# Patient Record
Sex: FEMALE | Race: WHITE | NOT HISPANIC OR LATINO | URBAN - METROPOLITAN AREA
[De-identification: names, ages, dates, MRNs, and addresses within clinical notes are randomized per-mention and may not be internally consistent; named-entity substitution may affect disease eponyms.]

---

## 2018-02-08 ENCOUNTER — APPOINTMENT (OUTPATIENT)
Dept: URBAN - METROPOLITAN AREA CLINIC 200 | Age: 63
Setting detail: DERMATOLOGY
End: 2018-02-13

## 2018-02-08 DIAGNOSIS — B36.0 PITYRIASIS VERSICOLOR: ICD-10-CM

## 2018-02-08 PROCEDURE — OTHER PRESCRIPTION: OTHER

## 2018-02-08 PROCEDURE — 99202 OFFICE O/P NEW SF 15 MIN: CPT

## 2018-02-08 RX ORDER — CICLOPIROX 7.7 MG/ML
SUSPENSION TOPICAL
Qty: 1 | Refills: 6 | Status: ERX

## 2018-02-08 ASSESSMENT — LOCATION SIMPLE DESCRIPTION DERM
LOCATION SIMPLE: RIGHT BREAST
LOCATION SIMPLE: LEFT BREAST

## 2018-02-08 ASSESSMENT — SEVERITY ASSESSMENT: SEVERITY: MILD

## 2018-02-08 ASSESSMENT — LOCATION ZONE DERM: LOCATION ZONE: TRUNK

## 2018-02-08 ASSESSMENT — LOCATION DETAILED DESCRIPTION DERM
LOCATION DETAILED: RIGHT LATERAL BREAST 6-7:00 REGION
LOCATION DETAILED: LEFT INFRAMAMMARY CREASE (OUTER QUADRANT)

## 2018-02-08 NOTE — HPI: SECONDARY COMPLAINT
How Severe Is This Condition?: severe
Additional History: Worse in winter
How Severe Is This Condition?: moderate

## 2018-03-08 ENCOUNTER — HOSPITAL ENCOUNTER (OUTPATIENT)
Dept: PSYCHOLOGY | Facility: CLINIC | Age: 63
Discharge: HOME | End: 2018-03-08
Attending: PSYCHOLOGIST
Payer: MEDICARE

## 2018-03-08 DIAGNOSIS — F43.23 ADJUSTMENT DISORDER WITH MIXED ANXIETY AND DEPRESSED MOOD: Primary | ICD-10-CM

## 2018-03-08 ASSESSMENT — COGNITIVE AND FUNCTIONAL STATUS - GENERAL
ATTENTION: WNL
EST. PREMORBID INTELLIGENCE: ABOVE AVERAGE
AFFECT: FULL RANGE
CONCENTRATION: WNL
EYE_CONTACT: WNL
PSYCHOMOTOR FUNCTIONING: WNL
THOUGHT_PROCESS: WNL
MOOD: EUTHYMIC (NORMAL)
ORIENTATION: FULLY ORIENTED
THOUGHT_CONTENT: APPROPRIATE
SPEECH: REGULAR
INSIGHT: INTACT
REMOTE MEMORY: WNL
LIBIDO: NO CHANGE
AROUSAL LEVEL: ALERT
APPEARANCE: WELL GROOMED
RECENT MEMORY: WNL
SLEEP_WAKE_CYCLE: NO CHANGE
APPETITE: NO CHANGE
IMPULSE CONTROL: INTACT
DELUSIONS: NONE OR AGE APPROPRIATE
PERCEPTUAL FUNCTION: NORMAL

## 2018-03-08 NOTE — PROGRESS NOTES
Outpatient Psychology Progress Note    3/8/2018  Duration: 1 hour   [place] Cooper : 1955, a 62 y.o. female, for follow up visit.  Reason:  She has been experiencing adjustment and family issues.  HPI: MS     Current Evaluation:     Mental Status Evaluation:  Arousal Level: Alert  Appearance: Well Groomed  Speech: Regular  Psychomotor Functioning: WNL  Eye Contact: WNL  Est. Premorbid Intelligence: Above average  Orientation: Fully oriented  Attention: WNL  Concentration: WNL  Recent Memory: WNL  Remote Memory: WNL  Thought Content: Appropriate  Thought Process: WNL  Insight: Intact  Perceptual Function: Normal  Delusions: None or age appropriate  Sleeping: No Change  Appetite: No Change  Libido: No change  Affect: Full Range  Mood: Euthymic (normal)    Comments:      Additional Assessments done this visit:       Session Summary:   Pt generally doing well, engaged in rewarding personal and community activities.  Occasionally struggles c boundary issues involving daughter. Feels has lost focus in her program and has instead gotten sidetracked by administrative details. Agrees to get back to using her daily reader.     Interventions  Acceptance & Adjustment  Monitoring of Symptoms    Psychoeducation provided on:  Caregiver Issues     Recommendations:   Individual Therapy   1 hour q 2-3 weeks    Goals     None          Visit Diagnosis:   No diagnosis found.    Diagnostic Impression:        Psychological condition is generally improving       Shelly Le, PhD @ 4:16 PM

## 2018-03-29 ENCOUNTER — HOSPITAL ENCOUNTER (OUTPATIENT)
Dept: PSYCHOLOGY | Facility: CLINIC | Age: 63
Discharge: HOME | End: 2018-03-29
Attending: PSYCHOLOGIST
Payer: MEDICARE

## 2018-03-29 DIAGNOSIS — F43.23 ADJUSTMENT DISORDER WITH MIXED ANXIETY AND DEPRESSED MOOD: ICD-10-CM

## 2018-03-29 ASSESSMENT — COGNITIVE AND FUNCTIONAL STATUS - GENERAL
ORIENTATION: FULLY ORIENTED
CONCENTRATION: WNL
IMPULSE CONTROL: INTACT
APPEARANCE: WELL GROOMED
APPETITE: NO CHANGE
EST. PREMORBID INTELLIGENCE: ABOVE AVERAGE
EYE_CONTACT: WNL
THOUGHT_PROCESS: WNL
PSYCHOMOTOR FUNCTIONING: WNL
SPEECH: REGULAR;PRESSURED
AROUSAL LEVEL: ALERT
INSIGHT: INTACT
SLEEP_WAKE_CYCLE: NO CHANGE
THOUGHT_CONTENT: APPROPRIATE
ATTENTION: WNL
LIBIDO: NO CHANGE
RECENT MEMORY: WNL
REMOTE MEMORY: WNL
DELUSIONS: NONE OR AGE APPROPRIATE
PERCEPTUAL FUNCTION: NORMAL
MOOD: DEPRESSED;ANXIOUS
AFFECT: TENSE

## 2018-03-29 NOTE — PROGRESS NOTES
"Outpatient Psychology Progress Note    3/29/2018  Duration: 45 minutes   [place] Cooper : 1955, a 62 y.o. female, for follow up visit.  Reason:  She has been experiencing emotional distress, family stress.  HPI: MS     Current Evaluation:     Mental Status Evaluation:  Arousal Level: Alert  Appearance: Well Groomed  Speech: Regular, Pressured  Psychomotor Functioning: WNL  Eye Contact: WNL  Est. Premorbid Intelligence: Above average  Orientation: Fully oriented  Attention: WNL  Concentration: WNL  Recent Memory: WNL  Remote Memory: WNL  Thought Content: Appropriate  Thought Process: WNL  Insight: Intact  Perceptual Function: Normal  Delusions: None or age appropriate  Sleeping: No Change  Appetite: No Change  Libido: No change  Affect: Tense  Mood: Depressed, Anxious    Comments:      Additional Assessments done this visit:       Session Summary:   Pt adjusting to being \"empty zoe\".  While pleased c daughter's new living arrangements, she misses living c her grandchildren and is feeling a sense of loss. Affect tense due to this and to stress of possibly preparing to sell home.  Pt approaching in an organized, well planned manner. Coping appropriately c stress.  Starting new diet plan c weight loss of about 6 lbs to date.     Interventions  Acceptance & Adjustment  Anxiety Reduction Techniques  Monitoring of Symptoms    Psychoeducation provided on:  Other: n/a     Recommendations:   Individual Therapy   45 minutes 2 times monthly    Goals     None          Visit Diagnosis:   F43.23    Diagnostic Impression:        Psychological condition is generally improving       Shelly Le, PhD @ 4:04 PM  "

## 2018-04-23 ENCOUNTER — HOSPITAL ENCOUNTER (OUTPATIENT)
Dept: PSYCHOLOGY | Facility: CLINIC | Age: 63
Discharge: HOME | End: 2018-04-23
Payer: MEDICARE

## 2018-04-23 DIAGNOSIS — F43.23 ADJUSTMENT DISORDER WITH MIXED ANXIETY AND DEPRESSED MOOD: ICD-10-CM

## 2018-04-23 ASSESSMENT — COGNITIVE AND FUNCTIONAL STATUS - GENERAL
SLEEP_WAKE_CYCLE: DECREASED
MOOD: ANXIOUS
APPETITE: NO CHANGE
AFFECT: FULL RANGE
PERCEPTUAL FUNCTION: NORMAL
DELUSIONS: NONE OR AGE APPROPRIATE
CONCENTRATION: WNL
EST. PREMORBID INTELLIGENCE: ABOVE AVERAGE
EYE_CONTACT: WNL
THOUGHT_CONTENT: APPROPRIATE
APPEARANCE: WELL GROOMED
ATTENTION: WNL
LIBIDO: NO CHANGE
THOUGHT_PROCESS: WNL
IMPULSE CONTROL: INTACT
RECENT MEMORY: WNL
REMOTE MEMORY: WNL
AROUSAL LEVEL: ALERT
ORIENTATION: FULLY ORIENTED
PSYCHOMOTOR FUNCTIONING: WNL
INSIGHT: INTACT
SPEECH: REGULAR

## 2018-04-23 NOTE — PROGRESS NOTES
Outpatient Psychology Progress Note    2018  Duration: 45 minutes   [place] Cooper : 1955, a 62 y.o. female, for follow up visit.  Reason:  She has been experiencing adjustment and family issues    HPI: MS  Chief Complaint   Patient presents with   • Psychotherapy       Current Evaluation:     Mental Status Evaluation:  Arousal Level: Alert  Appearance: Well Groomed  Speech: Regular  Psychomotor Functioning: WNL  Eye Contact: WNL  Est. Premorbid Intelligence: Above average  Orientation: Fully oriented  Attention: WNL  Concentration: WNL  Recent Memory: WNL  Remote Memory: WNL  Thought Content: Appropriate  Thought Process: WNL  Insight: Intact  Perceptual Function: Normal  Delusions: None or age appropriate  Sleeping: Decreased  Appetite: No Change  Libido: No change  Affect: Full Range  Mood: Anxious    Comments:      Additional Assessments done this visit:       Session Summary:   Pt stressed by plan to put home on market one year ahead of initial plan. Pt finding she is more emotionally attached to home than expected.  She is doing well c taking care of herself and has lost 12 lbs. Pt maintaining consistent boundaries c daughter, despite daughter's current issues and desire to return home.     Interventions  Acceptance & Adjustment  Goal Setting  Monitoring of Symptoms    Psychoeducation provided on:  Other: stress mgmt     Recommendations:   Other: stress mgmt   45 minutes 2 times monthly    Goals     • Maximize adjustment and acceptance of limitations           Visit Diagnosis:   F43.23    Diagnostic Impression:        Psychological condition is generally improving       Shelly Le, PhD @ 11:18 AM

## 2018-05-14 ENCOUNTER — HOSPITAL ENCOUNTER (OUTPATIENT)
Dept: PSYCHOLOGY | Facility: CLINIC | Age: 63
Discharge: HOME | End: 2018-05-14
Payer: MEDICARE

## 2018-05-14 DIAGNOSIS — F43.23 ADJUSTMENT DISORDER WITH MIXED ANXIETY AND DEPRESSED MOOD: Primary | ICD-10-CM

## 2018-05-14 PROCEDURE — 90834 PSYTX W PT 45 MINUTES: CPT | Performed by: PSYCHOLOGIST

## 2018-05-14 ASSESSMENT — COGNITIVE AND FUNCTIONAL STATUS - GENERAL
EST. PREMORBID INTELLIGENCE: ABOVE AVERAGE
ATTENTION: WNL
PSYCHOMOTOR FUNCTIONING: WNL
CONCENTRATION: WNL
PERCEPTUAL FUNCTION: NORMAL
APPEARANCE: WELL GROOMED
THOUGHT_PROCESS: WNL
THOUGHT_CONTENT: APPROPRIATE
EYE_CONTACT: WNL
APPETITE: NO CHANGE
REMOTE MEMORY: WNL
DELUSIONS: NONE OR AGE APPROPRIATE
AFFECT: LABILE
IMPULSE CONTROL: INTACT
LIBIDO: NO CHANGE
INSIGHT: INTACT
SLEEP_WAKE_CYCLE: NO CHANGE
SPEECH: REGULAR
MOOD: ANXIOUS;DEPRESSED
RECENT MEMORY: WNL
AROUSAL LEVEL: ALERT
ORIENTATION: FULLY ORIENTED

## 2018-05-14 NOTE — PROGRESS NOTES
Outpatient Psychology Progress Note    2018  Duration: 45 minutes   [place] Cooper : 1955, a 62 y.o. female, for follow up visit.  Reason:  She has been experiencing adjustment issues.    HPI:   Chief Complaint   Patient presents with   • Psychotherapy       Current Evaluation:     Mental Status Evaluation:  Arousal Level: Alert  Appearance: Well Groomed  Speech: Regular  Psychomotor Functioning: WNL  Eye Contact: WNL  Est. Premorbid Intelligence: Above average  Orientation: Fully oriented  Attention: WNL  Concentration: WNL  Recent Memory: WNL  Remote Memory: WNL  Thought Content: Appropriate  Thought Process: WNL  Insight: Intact  Perceptual Function: Normal  Delusions: None or age appropriate  Sleeping: No Change  Appetite: No Change  Libido: No change  Affect: Labile  Mood: Anxious, Depressed    Comments:      Additional Assessments done this visit:       Session Summary:   Pt c labile affect, struggling c decision to sell home and more quickly than planned.  Has strong emotional attachment to home and has no concrete plan about where to go next. Processed Issues, normalized feelings and gave feedback about pt's control in this situation.    Interventions  Acceptance & Adjustment  Monitoring of Symptoms    Psychoeducation provided on:  Other: n/a     Recommendations:   Individual Therapy   45 minutes 2 times monthly    Goals     • Maximize adjustment and acceptance of limitations           Visit Diagnosis:     1. Adjustment disorder with mixed anxiety and depressed mood        Diagnostic Impression:        Psychological condition is generally improving       Shelly Le, PhD @ 11:31 AM

## 2018-06-21 ENCOUNTER — HOSPITAL ENCOUNTER (OUTPATIENT)
Dept: PSYCHOLOGY | Facility: CLINIC | Age: 63
Discharge: HOME | End: 2018-06-21
Payer: MEDICARE

## 2018-06-21 DIAGNOSIS — F43.23 ADJUSTMENT DISORDER WITH MIXED ANXIETY AND DEPRESSED MOOD: Primary | ICD-10-CM

## 2018-06-21 PROCEDURE — 90834 PSYTX W PT 45 MINUTES: CPT | Performed by: PSYCHOLOGIST

## 2018-06-21 RX ORDER — ROSUVASTATIN CALCIUM 20 MG/1
TABLET, COATED ORAL
Refills: 1 | COMMUNITY
Start: 2018-04-02

## 2018-06-21 RX ORDER — METHYLPHENIDATE HYDROCHLORIDE 10 MG/1
TABLET ORAL
Refills: 0 | COMMUNITY
Start: 2018-06-11

## 2018-06-21 RX ORDER — TRIAMTERENE AND HYDROCHLOROTHIAZIDE 37.5; 25 MG/1; MG/1
CAPSULE ORAL
Refills: 2 | COMMUNITY
Start: 2018-03-24

## 2018-06-21 RX ORDER — BUPROPION HYDROCHLORIDE 300 MG/1
TABLET ORAL
Refills: 0 | COMMUNITY
Start: 2018-05-14

## 2018-06-21 RX ORDER — GABAPENTIN 100 MG/1
CAPSULE ORAL
Refills: 3 | COMMUNITY
Start: 2018-05-04

## 2018-06-21 ASSESSMENT — COGNITIVE AND FUNCTIONAL STATUS - GENERAL
EST. PREMORBID INTELLIGENCE: ABOVE AVERAGE
SPEECH: REGULAR
AFFECT: FULL RANGE
PERCEPTUAL FUNCTION: NORMAL
CONCENTRATION: WNL
ORIENTATION: FULLY ORIENTED
REMOTE MEMORY: WNL
INSIGHT: INTACT
AROUSAL LEVEL: ALERT
APPETITE: NO CHANGE
ATTENTION: WNL
MOOD: EUTHYMIC (NORMAL)
EYE_CONTACT: WNL
THOUGHT_PROCESS: WNL
PSYCHOMOTOR FUNCTIONING: WNL
RECENT MEMORY: WNL
THOUGHT_CONTENT: APPROPRIATE
DELUSIONS: NONE OR AGE APPROPRIATE

## 2018-06-21 NOTE — PROGRESS NOTES
Outpatient Psychology Progress Note    Duration: 45 minutes   [place] Cooper : 1955, a 62 y.o. female, for follow up visit.  Reason:  She has been experiencing adjustment issues.    HPI: MS  Chief Complaint   Patient presents with   • Psychotherapy       Current Evaluation:     Mental Status Evaluation:  Arousal Level: Alert  Speech: Regular  Psychomotor Functioning: WNL  Eye Contact: WNL  Est. Premorbid Intelligence: Above average  Orientation: Fully oriented  Attention: WNL  Concentration: WNL  Recent Memory: WNL  Remote Memory: WNL  Thought Content: Appropriate  Thought Process: WNL  Insight: Intact  Perceptual Function: Normal  Delusions: None or age appropriate  Appetite: No Change  Affect: Full Range  Mood: Euthymic (normal)    Comments:      Additional Assessments done this visit:       Session Summary:   Pt c signif improvement in mood, generally + and hopeful but c realistic anxiety re family issues and other stressors.  Pt doing better c limit setting in all domains and feeling less stressed.  Has deliberately and successfully lost weight despite little exercise.  Reinforced current status.    Interventions  Acceptance & Adjustment  Monitoring of Symptoms    Psychoeducation provided on:  Other: n/a     Recommendations:   Individual Therapy   45 minutes 1-2 times monthly    Goals     • Maximize adjustment and acceptance of limitations           Visit Diagnosis:     1. Adjustment disorder with mixed anxiety and depressed mood        Diagnostic Impression:        Psychological condition is generally improving       Shelly Le, PhD @ 4:08 PM

## 2018-07-12 ENCOUNTER — HOSPITAL ENCOUNTER (OUTPATIENT)
Dept: PSYCHOLOGY | Facility: CLINIC | Age: 63
Discharge: HOME | End: 2018-07-12
Payer: MEDICARE

## 2018-07-12 DIAGNOSIS — F43.23 ADJUSTMENT DISORDER WITH MIXED ANXIETY AND DEPRESSED MOOD: Primary | ICD-10-CM

## 2018-07-12 PROCEDURE — 90834 PSYTX W PT 45 MINUTES: CPT | Performed by: PSYCHOLOGIST

## 2018-07-12 ASSESSMENT — COGNITIVE AND FUNCTIONAL STATUS - GENERAL
EYE_CONTACT: WNL
AFFECT: FULL RANGE
LIBIDO: NO CHANGE
PERCEPTUAL FUNCTION: NORMAL
SPEECH: REGULAR
AROUSAL LEVEL: ALERT
MOOD: ANXIOUS
CONCENTRATION: WNL
APPEARANCE: WELL GROOMED
DELUSIONS: NONE OR AGE APPROPRIATE
ATTENTION: WNL
EST. PREMORBID INTELLIGENCE: ABOVE AVERAGE
RECENT MEMORY: WNL
ORIENTATION: FULLY ORIENTED
APPETITE: NO CHANGE
REMOTE MEMORY: WNL
SLEEP_WAKE_CYCLE: NO CHANGE
PSYCHOMOTOR FUNCTIONING: WNL

## 2018-07-13 NOTE — PROGRESS NOTES
Outpatient Psychology Progress Note    Duration: 45 minutes   [place] Cooper : 1955, a 62 y.o. female, for follow up visit.  Reason:  She has been experiencing adjustment issues.    HPI:   Chief Complaint   Patient presents with   • Psychotherapy   • Multiple Sclerosis       Current Evaluation:     Mental Status Evaluation:  Arousal Level: Alert  Appearance: Well Groomed  Speech: Regular  Psychomotor Functioning: WNL  Eye Contact: WNL  Est. Premorbid Intelligence: Above average  Orientation: Fully oriented  Attention: WNL  Concentration: WNL  Recent Memory: WNL  Remote Memory: WNL  Perceptual Function: Normal  Delusions: None or age appropriate  Sleeping: No Change  Appetite: No Change  Libido: No change  Affect: Full Range  Mood: Anxious    Comments:      Additional Assessments done this visit:       Session Summary:   Pt c many recent + experiences, leading to improved adjustment and marital satisfaction. However, pt expresses anxiety about upcoming home sale and move.  Tends to worry about what others think of her; acknowledges and willing to address.      Interventions  Acceptance & Adjustment  Monitoring of Symptoms  CBT    Psychoeducation provided on:  Other: n/a     Recommendations:   Individual Therapy  2x/month    Goals     • Maximize adjustment and acceptance of limitations           Visit Diagnosis:     1. Adjustment disorder with mixed anxiety and depressed mood        Diagnostic Impression:        Psychological condition is generally improving       Shelly Le, PhD @ 7:24 AM

## 2018-09-20 ENCOUNTER — HOSPITAL ENCOUNTER (OUTPATIENT)
Dept: PSYCHOLOGY | Facility: CLINIC | Age: 63
Discharge: HOME | End: 2018-09-20
Payer: MEDICARE

## 2018-09-20 DIAGNOSIS — F43.23 ADJUSTMENT DISORDER WITH MIXED ANXIETY AND DEPRESSED MOOD: Primary | ICD-10-CM

## 2018-09-20 PROCEDURE — 90834 PSYTX W PT 45 MINUTES: CPT | Performed by: PSYCHOLOGIST

## 2018-09-20 ASSESSMENT — COGNITIVE AND FUNCTIONAL STATUS - GENERAL
INSIGHT: INTACT
CONCENTRATION: IMPAIRED, MILD
THOUGHT_PROCESS: RUMINATIONS;WORRY
ATTENTION: WNL
APPETITE: DECREASED
SPEECH: REGULAR
EST. PREMORBID INTELLIGENCE: ABOVE AVERAGE
PERCEPTUAL FUNCTION: NORMAL
SLEEP_WAKE_CYCLE: NO CHANGE
DELUSIONS: NONE OR AGE APPROPRIATE
IMPULSE CONTROL: INTACT
MOOD: ANXIOUS;OTHER:
AFFECT: TENSE
EYE_CONTACT: WNL
PSYCHOMOTOR FUNCTIONING: WNL
RECENT MEMORY: WNL
APPEARANCE: WELL GROOMED
ORIENTATION: FULLY ORIENTED
AROUSAL LEVEL: ALERT

## 2018-09-20 NOTE — PROGRESS NOTES
Outpatient Psychology Progress Note    Duration: 45 minutes   [place] Cooper : 1955, a 62 y.o. female, for follow up visit.  Reason:  She has been experiencing adjustment issues.    HPI:   Chief Complaint   Patient presents with   • Psychotherapy   • Depression       Current Evaluation:     Mental Status Evaluation:  Arousal Level: Alert  Appearance: Well Groomed  Speech: Regular  Psychomotor Functioning: WNL  Eye Contact: WNL  Est. Premorbid Intelligence: Above average  Orientation: Fully oriented  Attention: WNL  Concentration: Impaired, Mild  Recent Memory: WNL  Thought Process: Ruminations, Worry  Insight: Intact  Perceptual Function: Normal  Delusions: None or age appropriate  Sleeping: No Change  Appetite: Decreased  Affect: Tense  Mood: Anxious, Other:    Comments:     Additional Assessments done this visit:       Session Summary:   Pt c multiple stressors and decreased coping.  Tends to be overly self critical and to feel others are judging her when they are not.  Is pleased that she lost 32 lbs but not pleased c other life areas.  Is starting to reach out and use resources.  Much current dissatisfaction about new community; everyone is older and many are disabled.     Interventions  Acceptance & Adjustment  Cognitive Behavior Training  Monitoring of Symptoms    Psychoeducation provided on:  Other: stress mgmt     Recommendations:   Individual Therapy   45 minutes 2 times monthly    Goals     • Maximize adjustment and acceptance of limitations           Visit Diagnosis:     1. Adjustment disorder with mixed anxiety and depressed mood        Diagnostic Impression:        Psychological condition is generally worsening       Shelly Le, PhD @ 4:13 PM

## 2018-10-04 ENCOUNTER — HOSPITAL ENCOUNTER (OUTPATIENT)
Dept: PSYCHOLOGY | Facility: CLINIC | Age: 63
Discharge: HOME | End: 2018-10-04
Payer: MEDICARE

## 2018-10-04 DIAGNOSIS — F43.23 ADJUSTMENT DISORDER WITH MIXED ANXIETY AND DEPRESSED MOOD: Primary | ICD-10-CM

## 2018-10-04 PROCEDURE — 90834 PSYTX W PT 45 MINUTES: CPT | Performed by: PSYCHOLOGIST

## 2018-10-04 ASSESSMENT — COGNITIVE AND FUNCTIONAL STATUS - GENERAL
THOUGHT_CONTENT: APPROPRIATE
PSYCHOMOTOR FUNCTIONING: WNL
INSIGHT: INTACT
EST. PREMORBID INTELLIGENCE: ABOVE AVERAGE
APPEARANCE: WELL GROOMED
SLEEP_WAKE_CYCLE: NO CHANGE
RECENT MEMORY: WNL
PERCEPTUAL FUNCTION: NORMAL
AFFECT: TENSE
IMPULSE CONTROL: INTACT
MOOD: ANXIOUS;OTHER:
APPETITE: NO CHANGE
LIBIDO: NO CHANGE
ATTENTION: WNL
DELUSIONS: NONE OR AGE APPROPRIATE
CONCENTRATION: WNL
EYE_CONTACT: WNL
AROUSAL LEVEL: ALERT
ORIENTATION: FULLY ORIENTED
SPEECH: REGULAR
THOUGHT_PROCESS: RUMINATIONS;WORRY

## 2018-10-04 NOTE — PROGRESS NOTES
"Outpatient Psychology Progress Note    Duration: 45 minutes   [place] Cooper : 1955, a 62 y.o. female, for follow up visit.  Reason:  She has been experiencing adjustment issues.    HPI:   Chief Complaint   Patient presents with   • Psychotherapy       Current Evaluation:     Mental Status Evaluation:  Arousal Level: Alert  Appearance: Well Groomed  Speech: Regular  Psychomotor Functioning: WNL  Eye Contact: WNL  Est. Premorbid Intelligence: Above average  Orientation: Fully oriented  Attention: WNL  Concentration: WNL  Recent Memory: WNL  Thought Content: Appropriate  Thought Process: Ruminations, Worry  Insight: Intact  Perceptual Function: Normal  Delusions: None or age appropriate  Sleeping: No Change  Appetite: No Change  Libido: No change  Affect: Tense  Mood: Anxious, Other:, mildly depressed    Comments:      Additional Assessments done this visit:       Session Summary:   Pt feeling that she \"doesn't fit in\" to current life in multiple ways. Mood is mildly depressed and anxious.  Explored feelings and reasons for current emotions.  Pt having difficulty c prioritizing and time mgmt in face of multiple demands and wishes.     Interventions  Acceptance & Adjustment  Monitoring of Symptoms    Psychoeducation provided on:  Other: n/a     Recommendations:   Individual Therapy   45 minutes 2 times monthly    Goals     • Maximize adjustment and acceptance of limitations           Visit Diagnosis:     1. Adjustment disorder with mixed anxiety and depressed mood        Diagnostic Impression:        Psychological condition is generally improving       Shelly Le, PhD @ 4:05 PM  "

## 2018-11-01 ENCOUNTER — HOSPITAL ENCOUNTER (OUTPATIENT)
Dept: PSYCHOLOGY | Facility: CLINIC | Age: 63
Discharge: HOME | End: 2018-11-01
Payer: MEDICARE

## 2018-11-01 DIAGNOSIS — F43.23 ADJUSTMENT DISORDER WITH MIXED ANXIETY AND DEPRESSED MOOD: Primary | ICD-10-CM

## 2018-11-01 PROCEDURE — 90834 PSYTX W PT 45 MINUTES: CPT | Performed by: PSYCHOLOGIST

## 2018-11-01 ASSESSMENT — COGNITIVE AND FUNCTIONAL STATUS - GENERAL
PSYCHOMOTOR FUNCTIONING: WNL
THOUGHT_PROCESS: WNL
DELUSIONS: NONE OR AGE APPROPRIATE
SPEECH: REGULAR
CONCENTRATION: WNL
ORIENTATION: FULLY ORIENTED
REMOTE MEMORY: WNL
ATTENTION: WNL
PERCEPTUAL FUNCTION: NORMAL
RECENT MEMORY: WNL
INSIGHT: INTACT
AFFECT: FULL RANGE
LIBIDO: NO CHANGE
APPETITE: NO CHANGE
AROUSAL LEVEL: ALERT
MOOD: EUTHYMIC (NORMAL)
SLEEP_WAKE_CYCLE: NO CHANGE
APPEARANCE: WELL GROOMED
EYE_CONTACT: WNL
EST. PREMORBID INTELLIGENCE: ABOVE AVERAGE

## 2018-11-01 NOTE — PROGRESS NOTES
Outpatient Psychology Progress Note    Duration: 45 minutes   [place] Cooper : 1955, a 63 y.o. female, for follow up visit.  Reason:  She has been experiencing adjustment issues.    HPI:   Chief Complaint   Patient presents with   • Psychotherapy   • Depression       Current Evaluation:     Mental Status Evaluation:  Arousal Level: Alert  Appearance: Well Groomed  Speech: Regular  Psychomotor Functioning: WNL  Eye Contact: WNL  Est. Premorbid Intelligence: Above average  Orientation: Fully oriented  Attention: WNL  Concentration: WNL  Recent Memory: WNL  Remote Memory: WNL  Thought Process: WNL  Insight: Intact  Perceptual Function: Normal  Delusions: None or age appropriate  Sleeping: No Change  Appetite: No Change  Libido: No change  Affect: Full Range  Mood: Euthymic (normal)    Comments:      Additional Assessments done this visit:       Session Summary:   Pt reports recent episode of depression, but today presents c bright affect and mood.  Is more satisfied c current life and less inclined to negatively compare herself to others.  Has lost 45 lbs and is pleased.     Interventions  Acceptance & Adjustment  Monitoring of Symptoms    Psychoeducation provided on:  Other: n/a     Recommendations:   Individual Therapy   45 minutes 2 times monthly    Goals     • Maximize adjustment and acceptance of limitations           Visit Diagnosis:     1. Adjustment disorder with mixed anxiety and depressed mood        Diagnostic Impression:        Psychological condition is generally improving       Shelly Le, PhD @ 4:06 PM

## 2018-12-20 ENCOUNTER — HOSPITAL ENCOUNTER (OUTPATIENT)
Dept: PSYCHOLOGY | Facility: CLINIC | Age: 63
Discharge: HOME | End: 2018-12-20
Payer: MEDICARE

## 2018-12-20 DIAGNOSIS — F43.23 ADJUSTMENT DISORDER WITH MIXED ANXIETY AND DEPRESSED MOOD: Primary | ICD-10-CM

## 2018-12-20 PROCEDURE — 90837 PSYTX W PT 60 MINUTES: CPT | Performed by: PSYCHOLOGIST

## 2018-12-20 ASSESSMENT — COGNITIVE AND FUNCTIONAL STATUS - GENERAL
PSYCHOMOTOR FUNCTIONING: WNL
RECENT MEMORY: WNL
SPEECH: REGULAR
APPEARANCE: WELL GROOMED
MOOD: EUTHYMIC (NORMAL)
AFFECT: FULL RANGE
IMPULSE CONTROL: INTACT
SLEEP_WAKE_CYCLE: NO CHANGE
THOUGHT_PROCESS: WNL
ORIENTATION: FULLY ORIENTED
AROUSAL LEVEL: ALERT
EST. PREMORBID INTELLIGENCE: ABOVE AVERAGE
ATTENTION: WNL
THOUGHT_CONTENT: APPROPRIATE
APPETITE: NO CHANGE
INSIGHT: INTACT
REMOTE MEMORY: WNL
PERCEPTUAL FUNCTION: NORMAL
CONCENTRATION: WNL
LIBIDO: NO CHANGE
DELUSIONS: NONE OR AGE APPROPRIATE

## 2018-12-20 NOTE — PROGRESS NOTES
Outpatient Psychology Progress Note    Duration: 1 hour  [place] Cooper : 1955, a 63 y.o. female, for follow up visit.  Reason:  She has been experiencing adjustment issues.    HPI: MS    Current Evaluation:     Mental Status Evaluation:  Arousal Level: Alert  Appearance: Well Groomed  Speech: Regular  Psychomotor Functioning: WNL  Est. Premorbid Intelligence: Above average  Orientation: Fully oriented  Attention: WNL  Concentration: WNL  Recent Memory: WNL  Remote Memory: WNL  Thought Content: Appropriate  Thought Process: WNL  Insight: Intact  Perceptual Function: Normal  Delusions: None or age appropriate  Sleeping: No Change  Appetite: No Change  Libido: No change  Affect: Full Range  Mood: Euthymic (normal)    Comments:      Additional Assessments done this visit:       Session Summary:   Pt presents c bright affect and mood, mildly anxious that things are too good to last and that something bad will therefore happen.  However, for the most part pt is able to enjoy being in a good place. Pt maintaining weight loss well. Will f/u in 1 month due to improvement..     Interventions  Acceptance & Adjustment  Monitoring of Symptoms    Psychoeducation provided on:  Other: n/a     Recommendations:   Individual Therapy   1 hour 1-2 times monthly    Goals     • Maximize adjustment and acceptance of limitations           Visit Diagnosis:     1. Adjustment disorder with mixed anxiety and depressed mood        Diagnostic Impression:        Psychological condition is generally improving       Shelly Le, PhD @ 4:13 PM

## 2019-01-24 ENCOUNTER — HOSPITAL ENCOUNTER (OUTPATIENT)
Dept: PSYCHOLOGY | Facility: CLINIC | Age: 64
Discharge: HOME | End: 2019-01-24
Payer: MEDICARE

## 2019-01-24 DIAGNOSIS — F43.23 ADJUSTMENT DISORDER WITH MIXED ANXIETY AND DEPRESSED MOOD: Primary | ICD-10-CM

## 2019-01-24 PROCEDURE — 90834 PSYTX W PT 45 MINUTES: CPT | Performed by: PSYCHOLOGIST

## 2019-01-24 ASSESSMENT — COGNITIVE AND FUNCTIONAL STATUS - GENERAL
CONCENTRATION: WNL
RECENT MEMORY: WNL
ATTENTION: WNL
AFFECT: FULL RANGE
APPEARANCE: WELL GROOMED
THOUGHT_PROCESS: WNL
EYE_CONTACT: WNL
PSYCHOMOTOR FUNCTIONING: WNL;FATIGUED
MOOD: EUTHYMIC (NORMAL)
REMOTE MEMORY: WNL
AROUSAL LEVEL: ALERT
PERCEPTUAL FUNCTION: NORMAL
EST. PREMORBID INTELLIGENCE: ABOVE AVERAGE
THOUGHT_CONTENT: APPROPRIATE
LIBIDO: NO CHANGE
SPEECH: REGULAR
DELUSIONS: NONE OR AGE APPROPRIATE
APPETITE: NO CHANGE
INSIGHT: INTACT
IMPULSE CONTROL: INTACT
SLEEP_WAKE_CYCLE: NO CHANGE

## 2019-01-24 NOTE — PROGRESS NOTES
Outpatient Psychology Progress Note    Duration: 45 minutes  [place] Cooper : 1955, a 63 y.o. female, for follow up visit.  Reason:  She has been experiencing adjustment issues.    HPI:   Chief Complaint   Patient presents with   • Psychotherapy   • Multiple Sclerosis       Current Evaluation:     Mental Status Evaluation:  Arousal Level: Alert  Appearance: Well Groomed  Speech: Regular  Psychomotor Functioning: WNL, Fatigued  Eye Contact: WNL  Est. Premorbid Intelligence: Above average  Attention: WNL  Concentration: WNL  Recent Memory: WNL  Remote Memory: WNL  Thought Content: Appropriate  Thought Process: WNL  Insight: Intact  Perceptual Function: Normal  Delusions: None or age appropriate  Sleeping: No Change  Appetite: No Change  Libido: No change  Affect: Full Range  Mood: Euthymic (normal)    Comments:      Additional Assessments done this visit:       Session Summary:   Pt c more fatigue but otherwise doing well.  Exercising and socializing more.  Shows + adjustment.  Encouraged pt to discuss fatigue c her neurologist, given h/o MS. Reinforced current coping.      Interventions  Acceptance & Adjustment  Monitoring of Symptoms    Psychoeducation provided on:  Other: n/a     Recommendations:   Individual Therapy   45 minutes monthly    Goals     • Maximize adjustment and acceptance of limitations           Visit Diagnosis:     1. Adjustment disorder with mixed anxiety and depressed mood        Diagnostic Impression:        Psychological condition is generally improving       Shelly Le, PhD @ 4:04 PM

## 2019-02-25 ENCOUNTER — APPOINTMENT (OUTPATIENT)
Dept: URBAN - METROPOLITAN AREA CLINIC 200 | Age: 64
Setting detail: DERMATOLOGY
End: 2019-02-27

## 2019-02-25 DIAGNOSIS — L57.8 OTHER SKIN CHANGES DUE TO CHRONIC EXPOSURE TO NONIONIZING RADIATION: ICD-10-CM

## 2019-02-25 DIAGNOSIS — D22 MELANOCYTIC NEVI: ICD-10-CM

## 2019-02-25 PROBLEM — D22.39 MELANOCYTIC NEVI OF OTHER PARTS OF FACE: Status: ACTIVE | Noted: 2019-02-25

## 2019-02-25 PROCEDURE — OTHER REASSURANCE: OTHER

## 2019-02-25 PROCEDURE — OTHER MIPS QUALITY: OTHER

## 2019-02-25 PROCEDURE — 99213 OFFICE O/P EST LOW 20 MIN: CPT

## 2019-02-25 PROCEDURE — OTHER COUNSELING: OTHER

## 2019-02-25 ASSESSMENT — LOCATION ZONE DERM
LOCATION ZONE: FACE
LOCATION ZONE: TRUNK

## 2019-02-25 ASSESSMENT — LOCATION SIMPLE DESCRIPTION DERM
LOCATION SIMPLE: LEFT FOREHEAD
LOCATION SIMPLE: CHEST

## 2019-02-25 ASSESSMENT — LOCATION DETAILED DESCRIPTION DERM
LOCATION DETAILED: LEFT SUPERIOR LATERAL FOREHEAD
LOCATION DETAILED: LEFT MEDIAL SUPERIOR CHEST

## 2019-03-07 ENCOUNTER — HOSPITAL ENCOUNTER (OUTPATIENT)
Dept: PSYCHOLOGY | Facility: CLINIC | Age: 64
Discharge: HOME | End: 2019-03-07
Payer: MEDICARE

## 2019-03-07 DIAGNOSIS — F43.23 ADJUSTMENT DISORDER WITH MIXED ANXIETY AND DEPRESSED MOOD: Primary | ICD-10-CM

## 2019-03-07 PROCEDURE — 90834 PSYTX W PT 45 MINUTES: CPT | Performed by: PSYCHOLOGIST

## 2019-03-07 ASSESSMENT — COGNITIVE AND FUNCTIONAL STATUS - GENERAL
AROUSAL LEVEL: ALERT
PERCEPTUAL FUNCTION: NORMAL
SPEECH: REGULAR
LIBIDO: NO CHANGE
EST. PREMORBID INTELLIGENCE: ABOVE AVERAGE
THOUGHT_PROCESS: WNL
DELUSIONS: NONE OR AGE APPROPRIATE
RECENT MEMORY: WNL
CONCENTRATION: WNL
IMPULSE CONTROL: IMPAIRED, MINIMALLY
EYE_CONTACT: WNL
ORIENTATION: FULLY ORIENTED
APPEARANCE: WELL GROOMED
REMOTE MEMORY: WNL
MOOD: DEPRESSED;ANXIOUS
PSYCHOMOTOR FUNCTIONING: WNL;FATIGUED
SLEEP_WAKE_CYCLE: DECREASED
AFFECT: LABILE;TENSE
THOUGHT_CONTENT: APPROPRIATE
APPETITE: INCREASED
INSIGHT: INTACT

## 2019-03-07 NOTE — PROGRESS NOTES
Outpatient Psychology Progress Note    Duration: 45 minutes  [place] Cooper : 1955, a 63 y.o. female, for follow up visit.  Reason:  She has been experiencing adjustment issues.    HPI:   Chief Complaint   Patient presents with   • Psychotherapy   • Depression       Current Evaluation:     Mental Status Evaluation:  Arousal Level: Alert  Appearance: Well Groomed  Speech: Regular  Psychomotor Functioning: WNL, Fatigued  Eye Contact: WNL  Est. Premorbid Intelligence: Above average  Orientation: Fully oriented  Concentration: WNL  Recent Memory: WNL  Remote Memory: WNL  Thought Content: Appropriate  Thought Process: WNL  Insight: Intact  Perceptual Function: Normal  Delusions: None or age appropriate  Sleeping: Decreased  Appetite: Increased  Libido: No change  Affect: Labile, Tense  Mood: Depressed, Anxious    Comments:      Additional Assessments done this visit:       Session Summary:   Pt more depressed and anxious.  Has had recent family health crises and significant change in routine.  Having difficulty getting back to normal schedule, overeating in response to stress and has regained the weight she has struggled to lose.  Feels like a failure.     Identified changes to improve sense of control and mood. Pt agreed to implement     Interventions  Acceptance & Adjustment  Anxiety Reduction Techniques  Monitoring of Symptoms    Psychoeducation provided on:  Other: n/a     Recommendations:   Individual Therapy   45 minutes 2 times monthly    Goals     • Maximize adjustment and acceptance of limitations           Visit Diagnosis:     1. Adjustment disorder with mixed anxiety and depressed mood        Diagnostic Impression:        Psychological condition is generally worsening       Shelly Le, PhD @ 3:56 PM

## 2019-03-29 ENCOUNTER — HOSPITAL ENCOUNTER (OUTPATIENT)
Dept: PSYCHOLOGY | Facility: CLINIC | Age: 64
Discharge: HOME | End: 2019-03-29
Payer: MEDICARE

## 2019-03-29 DIAGNOSIS — F43.23 ADJUSTMENT DISORDER WITH MIXED ANXIETY AND DEPRESSED MOOD: Primary | ICD-10-CM

## 2019-03-29 PROCEDURE — 90834 PSYTX W PT 45 MINUTES: CPT | Performed by: PSYCHOLOGIST

## 2019-03-29 RX ORDER — ROSUVASTATIN CALCIUM 10 MG/1
TABLET, COATED ORAL
Refills: 0 | COMMUNITY
Start: 2019-02-24

## 2019-03-29 RX ORDER — PEGINTERFERON BETA-1A 125 UG/.5ML
INJECTION, SOLUTION SUBCUTANEOUS
COMMUNITY
Start: 2019-03-14

## 2019-03-29 ASSESSMENT — COGNITIVE AND FUNCTIONAL STATUS - GENERAL
IMPULSE CONTROL: INTACT
SLEEP_WAKE_CYCLE: NO CHANGE
DELUSIONS: NONE OR AGE APPROPRIATE
MOOD: ANXIOUS;HOPEFUL
EST. PREMORBID INTELLIGENCE: ABOVE AVERAGE
APPEARANCE: WELL GROOMED
PSYCHOMOTOR FUNCTIONING: WNL
LIBIDO: NO CHANGE
ATTENTION: WNL
REMOTE MEMORY: WNL
APPETITE: NO CHANGE
THOUGHT_CONTENT: APPROPRIATE
PERCEPTUAL FUNCTION: NORMAL
INSIGHT: INTACT
RECENT MEMORY: WNL
SPEECH: REGULAR
ORIENTATION: FULLY ORIENTED
AFFECT: FULL RANGE
THOUGHT_PROCESS: WNL
CONCENTRATION: WNL
AROUSAL LEVEL: ALERT
EYE_CONTACT: WNL

## 2019-03-29 NOTE — PROGRESS NOTES
Outpatient Psychology Progress Note    Duration: 45 minutes  [place] Cooper : 1955, a 63 y.o. female, for follow up visit.  Reason:  She has been experiencing adjustment issues.    HPI:   Chief Complaint   Patient presents with   • Psychotherapy   • Depression   • Multiple Sclerosis       Current Evaluation:     Mental Status Evaluation:  Arousal Level: Alert  Appearance: Well Groomed  Speech: Regular  Psychomotor Functioning: WNL  Eye Contact: WNL  Est. Premorbid Intelligence: Above average  Orientation: Fully oriented  Attention: WNL  Concentration: WNL  Recent Memory: WNL  Remote Memory: WNL  Thought Content: Appropriate  Thought Process: WNL  Insight: Intact  Perceptual Function: Normal  Delusions: None or age appropriate  Sleeping: No Change  Appetite: No Change (managing better)  Libido: No change  Affect: Full Range  Mood: Anxious, Hopeful    Comments:      Additional Assessments done this visit:       Session Summary:   Pt c improved emotional functioning.  Is able to focus on own needs (basic self care, daily time out of home, attending meetings) while helping family through father's health issues.  Assisted pt in setting priorities and reinforced appropriate boundaries.      Interventions  Acceptance & Adjustment  Communication Skills development  Monitoring of Symptoms    Psychoeducation provided on:  Other: n/a     Recommendations:   Individual Therapy   45 minutes 2 times monthly    Goals     • Maximize adjustment and acceptance of limitations           Visit Diagnosis:     1. Adjustment disorder with mixed anxiety and depressed mood        Diagnostic Impression:        Psychological condition is generally improving       Shelly Le, PhD @ 3:47 PM

## 2019-04-25 ENCOUNTER — HOSPITAL ENCOUNTER (OUTPATIENT)
Dept: PSYCHOLOGY | Facility: CLINIC | Age: 64
Discharge: HOME | End: 2019-04-25
Payer: MEDICARE

## 2019-04-25 DIAGNOSIS — F43.23 ADJUSTMENT DISORDER WITH MIXED ANXIETY AND DEPRESSED MOOD: Primary | ICD-10-CM

## 2019-04-25 PROCEDURE — 90834 PSYTX W PT 45 MINUTES: CPT | Performed by: PSYCHOLOGIST

## 2019-04-25 RX ORDER — FEXOFENADINE HCL 60 MG/1
TABLET, FILM COATED ORAL
COMMUNITY

## 2019-04-25 RX ORDER — GABAPENTIN 100 MG/1
100 CAPSULE ORAL 4 TIMES DAILY
COMMUNITY
Start: 2018-05-04

## 2019-04-25 RX ORDER — METHYLPHENIDATE HYDROCHLORIDE 10 MG/1
10 TABLET ORAL 2 TIMES DAILY
COMMUNITY
Start: 2019-04-08

## 2019-04-25 RX ORDER — TRIAMTERENE/HYDROCHLOROTHIAZID 37.5-25 MG
1 TABLET ORAL DAILY
COMMUNITY

## 2019-04-25 RX ORDER — BUPROPION HYDROCHLORIDE 300 MG/1
300 TABLET ORAL
COMMUNITY
Start: 2018-11-27

## 2019-04-25 RX ORDER — ROSUVASTATIN CALCIUM 10 MG/1
TABLET, COATED ORAL
COMMUNITY
Start: 2018-11-27

## 2019-04-25 RX ORDER — NAPROXEN 500 MG/1
TABLET ORAL
COMMUNITY
Start: 2017-06-09

## 2019-04-25 RX ORDER — OXYCODONE AND ACETAMINOPHEN 5; 325 MG/1; MG/1
TABLET ORAL
Refills: 0 | COMMUNITY
Start: 2019-04-09

## 2019-04-25 ASSESSMENT — COGNITIVE AND FUNCTIONAL STATUS - GENERAL
CONCENTRATION: WNL
ATTENTION: WNL
ORIENTATION: FULLY ORIENTED
INSIGHT: INTACT
RECENT MEMORY: WNL
REMOTE MEMORY: WNL
AFFECT: FULL RANGE
EYE_CONTACT: WNL
MOOD: EUTHYMIC (NORMAL);HOPEFUL;MOTIVATED
APPEARANCE: WELL GROOMED
APPETITE: NO CHANGE
PSYCHOMOTOR FUNCTIONING: WNL
THOUGHT_CONTENT: APPROPRIATE
SLEEP_WAKE_CYCLE: NO CHANGE
PERCEPTUAL FUNCTION: NORMAL
SPEECH: REGULAR
IMPULSE CONTROL: INTACT
DELUSIONS: NONE OR AGE APPROPRIATE
AROUSAL LEVEL: ALERT
THOUGHT_PROCESS: WNL
EST. PREMORBID INTELLIGENCE: ABOVE AVERAGE
LIBIDO: NON-CONTRIBUTORY

## 2019-04-25 NOTE — PROGRESS NOTES
Outpatient Psychology Progress Note    Duration: 45 minutes  [place] Cooper : 1955, a 63 y.o. female, for follow up visit.  Reason:  She has been experiencing adjustment issues.    HPI:   Chief Complaint   Patient presents with   • Psychotherapy   • Anxiety   • Depression       Current Evaluation:     Mental Status Evaluation:  Arousal Level: Alert  Appearance: Well Groomed  Speech: Regular  Psychomotor Functioning: WNL  Eye Contact: WNL  Est. Premorbid Intelligence: Above average  Orientation: Fully oriented  Attention: WNL  Concentration: WNL  Recent Memory: WNL  Remote Memory: WNL  Thought Content: Appropriate  Thought Process: WNL  Insight: Intact  Perceptual Function: Normal  Delusions: None or age appropriate  Sleeping: No Change (earlier sleep and wake times)  Appetite: No Change  Libido: Non-Contributory  Affect: Full Range  Mood: Euthymic (normal), Hopeful, Motivated    Comments:      Additional Assessments done this visit:       Session Summary:   Pt managing issues related to weight more effectively using an skyla and a diet.  Pleased c weight loss.  Pt working more actively to address relationship issues c tools from [employer]. More socially and physically active.     Supported gains.      Interventions  Acceptance & Adjustment  Goal Setting  Monitoring of Symptoms    Psychoeducation provided on:  Other: n/a     Recommendations:   Individual Therapy   45 minutes 1-2 times monthly    Goals     • Maximize adjustment and acceptance of limitations           Visit Diagnosis:     1. Adjustment disorder with mixed anxiety and depressed mood        Diagnostic Impression:        Psychological condition is generally improving       Shelly Le, PhD @ 4:15 PM

## 2019-05-08 ENCOUNTER — HOSPITAL ENCOUNTER (OUTPATIENT)
Dept: PSYCHOLOGY | Facility: CLINIC | Age: 64
Discharge: HOME | End: 2019-05-08
Payer: MEDICARE

## 2019-05-08 DIAGNOSIS — F43.23 ADJUSTMENT DISORDER WITH MIXED ANXIETY AND DEPRESSED MOOD: Primary | ICD-10-CM

## 2019-05-08 PROCEDURE — 90834 PSYTX W PT 45 MINUTES: CPT | Performed by: PSYCHOLOGIST

## 2019-05-08 ASSESSMENT — COGNITIVE AND FUNCTIONAL STATUS - GENERAL
REMOTE MEMORY: WNL
AFFECT: FULL RANGE
MOOD: EUTHYMIC (NORMAL);HOPEFUL;MOTIVATED;ANXIOUS
AROUSAL LEVEL: ALERT
PSYCHOMOTOR FUNCTIONING: WNL
DELUSIONS: NONE OR AGE APPROPRIATE
EST. PREMORBID INTELLIGENCE: ABOVE AVERAGE
SLEEP_WAKE_CYCLE: NO CHANGE
ORIENTATION: FULLY ORIENTED
EYE_CONTACT: WNL
APPEARANCE: WELL GROOMED
INSIGHT: INTACT
SPEECH: REGULAR
LIBIDO: NO CHANGE
APPETITE: NO CHANGE
THOUGHT_PROCESS: WNL
IMPULSE CONTROL: INTACT
PERCEPTUAL FUNCTION: NORMAL
ATTENTION: WNL
CONCENTRATION: WNL
THOUGHT_CONTENT: APPROPRIATE
RECENT MEMORY: WNL

## 2019-05-08 NOTE — PROGRESS NOTES
Outpatient Psychology Progress Note    Duration: 45 minutes  Africa Cooper : 1955, a 63 y.o. female, for follow up visit.  Reason:  She has been experiencing adjustment issues.    HPI:   Chief Complaint   Patient presents with   • Psychotherapy   • Depression   • Anxiety       Current Evaluation:     Mental Status Evaluation:  Arousal Level: Alert  Appearance: Well Groomed  Speech: Regular  Psychomotor Functioning: WNL  Eye Contact: WNL  Est. Premorbid Intelligence: Above average  Orientation: Fully oriented  Attention: WNL  Concentration: WNL  Recent Memory: WNL  Remote Memory: WNL  Thought Content: Appropriate  Thought Process: WNL  Insight: Intact  Perceptual Function: Normal  Delusions: None or age appropriate  Sleeping: No Change  Appetite: No Change  Libido: No change  Affect: Full Range  Mood: Euthymic (normal), Hopeful, Motivated, Anxious    Comments:      Additional Assessments done this visit:       Session Summary:   Pt generally doing well.  Maintaining diet and activities.  Making good choices re time but busy, comfortable doing some things for herself rather than devoting time to causes for others. Some anxiety re risk of daughter relapsing but slowly learning to feel more trusting.      Interventions  Acceptance & Adjustment  Monitoring of Symptoms    Psychoeducation provided on:  Other: n/a     Recommendations:   Individual Therapy   45 minutes 1-2 times monthly    Goals     • Maximize adjustment and acceptance of limitations           Visit Diagnosis:     1. Adjustment disorder with mixed anxiety and depressed mood        Diagnostic Impression:        Psychological condition is generally improving       Shelly Le, PhD @ 4:06 PM

## 2019-07-11 ENCOUNTER — HOSPITAL ENCOUNTER (OUTPATIENT)
Dept: PSYCHOLOGY | Facility: CLINIC | Age: 64
Discharge: HOME | End: 2019-07-11
Payer: MEDICARE

## 2019-07-11 DIAGNOSIS — F43.23 ADJUSTMENT DISORDER WITH MIXED ANXIETY AND DEPRESSED MOOD: Primary | ICD-10-CM

## 2019-07-11 PROCEDURE — 90834 PSYTX W PT 45 MINUTES: CPT | Performed by: PSYCHOLOGIST

## 2019-07-11 RX ORDER — VALACYCLOVIR HYDROCHLORIDE 1 G/1
TABLET, FILM COATED ORAL
Refills: 1 | COMMUNITY
Start: 2019-05-30

## 2019-07-11 RX ORDER — BUPROPION HYDROCHLORIDE 300 MG/1
300 TABLET ORAL
COMMUNITY
Start: 2019-05-20

## 2019-07-11 RX ORDER — ROSUVASTATIN CALCIUM 10 MG/1
TABLET, COATED ORAL
COMMUNITY
Start: 2019-05-20

## 2019-07-11 RX ORDER — METHYLPHENIDATE HYDROCHLORIDE 10 MG/1
10 TABLET ORAL 2 TIMES DAILY
COMMUNITY
Start: 2019-05-28

## 2019-07-11 ASSESSMENT — COGNITIVE AND FUNCTIONAL STATUS - GENERAL
EST. PREMORBID INTELLIGENCE: ABOVE AVERAGE
PSYCHOMOTOR FUNCTIONING: WNL
INSIGHT: INTACT
IMPULSE CONTROL: INTACT
REMOTE MEMORY: WNL
AROUSAL LEVEL: ALERT
APPEARANCE: WELL GROOMED
SLEEP_WAKE_CYCLE: NO CHANGE
ATTENTION: WNL
AFFECT: FULL RANGE
THOUGHT_PROCESS: WNL
LIBIDO: NO CHANGE
SPEECH: REGULAR
ORIENTATION: FULLY ORIENTED
APPETITE: INCREASED
CONCENTRATION: WNL
EYE_CONTACT: WNL
DELUSIONS: NONE OR AGE APPROPRIATE
RECENT MEMORY: WNL
MOOD: EUTHYMIC (NORMAL);MOTIVATED

## 2019-07-11 NOTE — PROGRESS NOTES
Outpatient Psychology Progress Note    Duration: 50 minutes  Africa Cooper : 1955, a 63 y.o. female, for follow up visit.  Reason:  She has been experiencing adjustment issues.    HPI:   Chief Complaint   Patient presents with   • Psychotherapy   • Anxiety   • Depression       Current Evaluation:     Mental Status Evaluation:  Arousal Level: Alert  Appearance: Well Groomed  Speech: Regular  Psychomotor Functioning: WNL  Eye Contact: WNL  Est. Premorbid Intelligence: Above average  Orientation: Fully oriented  Attention: WNL  Concentration: WNL  Recent Memory: WNL  Remote Memory: WNL  Thought Process: WNL  Insight: Intact  Delusions: None or age appropriate  Sleeping: No Change  Appetite: Increased  Libido: No change  Affect: Full Range  Mood: Euthymic (normal), Motivated    Comments:      Additional Assessments done this visit:       Session Summary:   Pt c toe fx, which limits activities.  Pt believes recent brain scan shows MS to be stable but is waiting for f/u c her neurologist. Mood is + and pt seeing improvements in interpersonal relationships.     Mild weight gain following coming off diet and snacking excessively but pt is now eating c greater mindfulness and control.     Interventions  Acceptance & Adjustment  Goal Setting  Monitoring of Symptoms    Psychoeducation provided on:  Other: n/a     Recommendations:   Individual Therapy   45 minutes 1-2 times monthly    Goals     • Maximize adjustment and acceptance of limitations           Visit Diagnosis:   No diagnosis found.    Diagnostic Impression:        Psychological condition is generally improving       Shelly Le, PhD @ 4:28 PM

## 2019-08-08 ENCOUNTER — HOSPITAL ENCOUNTER (OUTPATIENT)
Dept: PSYCHOLOGY | Facility: CLINIC | Age: 64
Discharge: HOME | End: 2019-08-08
Payer: MEDICARE

## 2019-08-08 DIAGNOSIS — F43.23 ADJUSTMENT DISORDER WITH MIXED ANXIETY AND DEPRESSED MOOD: Primary | ICD-10-CM

## 2019-08-08 PROCEDURE — 90834 PSYTX W PT 45 MINUTES: CPT | Performed by: PSYCHOLOGIST

## 2019-08-08 ASSESSMENT — COGNITIVE AND FUNCTIONAL STATUS - GENERAL
MOOD: MOTIVATED;FRUSTRATED;ANXIOUS
IMPULSE CONTROL: IMPAIRED, MINIMALLY
EST. PREMORBID INTELLIGENCE: ABOVE AVERAGE
APPEARANCE: WELL GROOMED
SPEECH: REGULAR
SLEEP_WAKE_CYCLE: NO CHANGE
DELUSIONS: NONE OR AGE APPROPRIATE
AROUSAL LEVEL: ALERT
ATTENTION: WNL
ORIENTATION: FULLY ORIENTED
AFFECT: FULL RANGE
RECENT MEMORY: WNL
REMOTE MEMORY: WNL
APPETITE: INCREASED
PERCEPTUAL FUNCTION: NORMAL
THOUGHT_CONTENT: APPROPRIATE
EYE_CONTACT: WNL
LIBIDO: NO CHANGE
INSIGHT: INTACT
THOUGHT_PROCESS: NEGATIVITY;WNL
PSYCHOMOTOR FUNCTIONING: WNL
CONCENTRATION: WNL

## 2019-08-08 NOTE — PROGRESS NOTES
Outpatient Psychology Progress Note    Duration: 45 minutes  Africa Cooper : 1955, a 63 y.o. female, for follow up visit.  Reason:  She has been experiencing adjustment issuees.    HPI:   Chief Complaint   Patient presents with   • Psychotherapy   • Anxiety   • Depression   • Multiple Sclerosis       Current Evaluation:     Mental Status Evaluation:  Arousal Level: Alert  Appearance: Well Groomed  Speech: Regular  Psychomotor Functioning: WNL  Eye Contact: WNL  Est. Premorbid Intelligence: Above average  Orientation: Fully oriented  Attention: WNL  Concentration: WNL  Recent Memory: WNL  Remote Memory: WNL  Thought Content: Appropriate  Thought Process: Negativity, WNL  Insight: Intact  Perceptual Function: Normal  Delusions: None or age appropriate  Sleeping: No Change  Appetite: Increased  Libido: No change  Affect: Full Range  Mood: Motivated, Frustrated, Anxious    Comments:      Additional Assessments done this visit:       Session Summary:   Pt has had significant stress in the past few weeks. Has dealt c it relatively well but has been overeating, leading to a 10 lb weight gain. Pt frustrated c herself but beginning to get back on track and to view weight loss as possible again. Worked to establish reasonable weight goal which does not involve comparing herself to others.     Pt anxious about reapplication for Disability although has the same cog challenges which led to approval in the first place.       Interventions  Acceptance & Adjustment  Goal Setting  Monitoring of Symptoms    Psychoeducation provided on:  Other: n/a     Recommendations:   Individual Therapy   45 minutes 1-2 times monthly    Goals     • Maximize adjustment and acceptance of limitations           Visit Diagnosis:   No diagnosis found.    Diagnostic Impression:        Psychological condition is generally worsening       Shelly Le, PhD @ 4:11 PM

## 2019-08-29 ENCOUNTER — HOSPITAL ENCOUNTER (OUTPATIENT)
Dept: PSYCHOLOGY | Facility: CLINIC | Age: 64
Discharge: HOME | End: 2019-08-29
Payer: MEDICARE

## 2019-08-29 DIAGNOSIS — F43.23 ADJUSTMENT DISORDER WITH MIXED ANXIETY AND DEPRESSED MOOD: Primary | ICD-10-CM

## 2019-08-29 PROCEDURE — 90834 PSYTX W PT 45 MINUTES: CPT | Performed by: PSYCHOLOGIST

## 2019-08-29 ASSESSMENT — COGNITIVE AND FUNCTIONAL STATUS - GENERAL
RECENT MEMORY: WNL
AROUSAL LEVEL: ALERT
EYE_CONTACT: WNL
ORIENTATION: FULLY ORIENTED
THOUGHT_PROCESS: WNL;NEGATIVITY
SPEECH: REGULAR
THOUGHT_CONTENT: APPROPRIATE
MOOD: MOTIVATED;FRUSTRATED;ANXIOUS
CONCENTRATION: WNL
INSIGHT: INTACT
SLEEP_WAKE_CYCLE: NO CHANGE
LIBIDO: NO CHANGE
PSYCHOMOTOR FUNCTIONING: WNL
APPETITE: INCREASED
EST. PREMORBID INTELLIGENCE: ABOVE AVERAGE
AFFECT: FULL RANGE
APPEARANCE: WELL GROOMED
ATTENTION: WNL
REMOTE MEMORY: WNL
DELUSIONS: NONE OR AGE APPROPRIATE
PERCEPTUAL FUNCTION: NORMAL

## 2019-08-29 NOTE — PROGRESS NOTES
Outpatient Psychology Progress Note    Duration: 45 minutes  Africa Cooper : 1955, a 63 y.o. female, for follow up visit.  Reason:  She has been experiencing adjustment issues.    HPI:   Chief Complaint   Patient presents with   • Psychotherapy   • Anxiety   • Depression   • Multiple Sclerosis       Current Evaluation:     Mental Status Evaluation:  Arousal Level: Alert  Appearance: Well Groomed  Speech: Regular  Psychomotor Functioning: WNL  Eye Contact: WNL  Est. Premorbid Intelligence: Above average  Orientation: Fully oriented  Attention: WNL  Concentration: WNL  Recent Memory: WNL  Remote Memory: WNL  Thought Content: Appropriate  Thought Process: WNL, Negativity  Insight: Intact  Perceptual Function: Normal  Delusions: None or age appropriate  Sleeping: No Change  Appetite: Increased  Libido: No change  Affect: Full Range  Mood: Motivated, Frustrated, Anxious    Comments:      Additional Assessments done this visit:       Session Summary:   Pt frustrated by 10 lb weight gain and by relapse into old habit of night time eating.  Identified 3 concrete behavioral changes, including emailing me daily c weight and update.     Pt feeling more concerned about daughter and resentful of perceived need to intervene/be available.      Interventions  Acceptance & Adjustment  Communication Skills development  Monitoring of Symptoms    Psychoeducation provided on:  Other: weight control strategies     Recommendations:   Individual Therapy   45 minutes 2 times monthly    Goals     • Maximize adjustment and acceptance of limitations           Visit Diagnosis:     1. Adjustment disorder with mixed anxiety and depressed mood        Diagnostic Impression:        Psychological condition is generally showing no change       Shelly Le, PhD @ 3:58 PM

## 2019-09-12 ENCOUNTER — HOSPITAL ENCOUNTER (OUTPATIENT)
Dept: PSYCHOLOGY | Facility: CLINIC | Age: 64
Discharge: HOME | End: 2019-09-12
Payer: MEDICARE

## 2019-09-12 DIAGNOSIS — F43.23 ADJUSTMENT DISORDER WITH MIXED ANXIETY AND DEPRESSED MOOD: Primary | ICD-10-CM

## 2019-09-12 PROCEDURE — 90834 PSYTX W PT 45 MINUTES: CPT | Performed by: PSYCHOLOGIST

## 2019-09-12 ASSESSMENT — COGNITIVE AND FUNCTIONAL STATUS - GENERAL
SPEECH: REGULAR
SLEEP_WAKE_CYCLE: INCREASED
EST. PREMORBID INTELLIGENCE: ABOVE AVERAGE
PSYCHOMOTOR FUNCTIONING: WNL;FATIGUED
DELUSIONS: NONE OR AGE APPROPRIATE
APPETITE: NO CHANGE
LIBIDO: NO CHANGE
AROUSAL LEVEL: ALERT
IMPULSE CONTROL: IMPAIRED, MINIMALLY
CONCENTRATION: WNL
MOOD: ANXIOUS;FRUSTRATED;MOTIVATED
ORIENTATION: FULLY ORIENTED
PERCEPTUAL FUNCTION: NORMAL
THOUGHT_CONTENT: APPROPRIATE
APPEARANCE: WELL GROOMED
AFFECT: FULL RANGE
EYE_CONTACT: WNL
THOUGHT_PROCESS: WNL;WORRY
RECENT MEMORY: WNL
INSIGHT: INTACT
ATTENTION: WNL
REMOTE MEMORY: WNL

## 2019-09-12 NOTE — PROGRESS NOTES
Outpatient Psychology Progress Note    Duration: 45 minutes  Africa Cooper : 1955, a 63 y.o. female, for follow up visit.  Reason:  She has been experiencing adjustment issues.    HPI:   Chief Complaint   Patient presents with   • Psychotherapy   • Anxiety   • Depression   • Multiple Sclerosis       Current Evaluation:     Mental Status Evaluation:  Arousal Level: Alert  Appearance: Well Groomed  Speech: Regular  Psychomotor Functioning: WNL, Fatigued  Eye Contact: WNL  Est. Premorbid Intelligence: Above average  Orientation: Fully oriented  Attention: WNL  Concentration: WNL  Recent Memory: WNL  Remote Memory: WNL  Thought Content: Appropriate  Thought Process: WNL, Worry  Insight: Intact  Perceptual Function: Normal  Delusions: None or age appropriate  Sleeping: Increased  Appetite: No Change  Libido: No change  Affect: Full Range  Mood: Anxious, Frustrated, Motivated    Comments:      Additional Assessments done this visit:       Session Summary:   Pt showing improved eating and sleeping habits since we implemented a daily check in procedure.  However, she has not lost the weight she expected and is frustrated by this.     Pt experiencing increased fatigue due to MS and inconsistent in limiting activities.     Pt expresses appropriate concern re family issues.  Receptive to feedback and suggestions.      Interventions  Acceptance & Adjustment  Cognitive Behavior Training  Communication Skills development  Monitoring of Symptoms    Psychoeducation provided on:  Other: n/a     Recommendations:   Individual Therapy   45 minutes 2 times monthly    Goals     • Maximize adjustment and acceptance of limitations           Visit Diagnosis:     1. Adjustment disorder with mixed anxiety and depressed mood        Diagnostic Impression:        Psychological condition is generally improving       Shelly Le, PhD @ 4:08 PM

## 2019-09-26 ENCOUNTER — HOSPITAL ENCOUNTER (OUTPATIENT)
Dept: PSYCHOLOGY | Facility: CLINIC | Age: 64
Discharge: HOME | End: 2019-09-26
Payer: MEDICARE

## 2019-09-26 DIAGNOSIS — F43.23 ADJUSTMENT DISORDER WITH MIXED ANXIETY AND DEPRESSED MOOD: Primary | ICD-10-CM

## 2019-09-26 PROCEDURE — 90834 PSYTX W PT 45 MINUTES: CPT | Performed by: PSYCHOLOGIST

## 2019-09-26 ASSESSMENT — COGNITIVE AND FUNCTIONAL STATUS - GENERAL
RECENT MEMORY: WNL
CONCENTRATION: WNL
PSYCHOMOTOR FUNCTIONING: WNL
REMOTE MEMORY: WNL
ORIENTATION: FULLY ORIENTED
EYE_CONTACT: WNL
AFFECT: FULL RANGE
THOUGHT_PROCESS: WNL;WORRY
INSIGHT: INTACT
SPEECH: REGULAR
AROUSAL LEVEL: ALERT
DELUSIONS: NONE OR AGE APPROPRIATE
SLEEP_WAKE_CYCLE: DECREASED
APPEARANCE: WELL GROOMED
LIBIDO: NO CHANGE
APPETITE: NO CHANGE
ATTENTION: WNL
PERCEPTUAL FUNCTION: NORMAL
MOOD: MOTIVATED;HOPEFUL;ANXIOUS
EST. PREMORBID INTELLIGENCE: ABOVE AVERAGE

## 2019-09-26 NOTE — PROGRESS NOTES
Outpatient Psychology Progress Note    Duration: 50 minutes  Africa Cooper : 1955, a 63 y.o. female, for follow up visit.  Reason:  She has been experiencing adjustment issues.    HPI:   Chief Complaint   Patient presents with   • Psychotherapy   • Anxiety   • Depression       Current Evaluation:     Mental Status Evaluation:  Arousal Level: Alert  Appearance: Well Groomed  Speech: Regular  Psychomotor Functioning: WNL  Eye Contact: WNL  Est. Premorbid Intelligence: Above average  Orientation: Fully oriented  Attention: WNL  Concentration: WNL  Recent Memory: WNL  Remote Memory: WNL  Thought Process: WNL, Worry  Insight: Intact  Perceptual Function: Normal  Delusions: None or age appropriate  Sleeping: Decreased (not going to be early on consistent basis)  Appetite: No Change  Libido: No change  Affect: Full Range  Mood: Motivated, Hopeful, Anxious    Comments:      Additional Assessments done this visit:       Session Summary:   Pt maintaining but not losing weight.  A little frustrated but motivated and insightful re her behavior and how it impacts her eating/weight.  Has been less successful c early bedtime but is not snacking as much as she used to. Socializing more and is enjoying this.     Handling interpersonal situations better and maintaining boundaries.  Appropriate concerns.     Interventions  Acceptance & Adjustment  Monitoring of Symptoms    Psychoeducation provided on:  Other: n/a     Recommendations:   Individual Therapy   45 minutes 2 times monthly    Goals     • Maximize adjustment and acceptance of limitations           Visit Diagnosis:     1. Adjustment disorder with mixed anxiety and depressed mood        Diagnostic Impression:        Psychological condition is generally showing no change       Shelly Le, PhD @ 4:08 PM

## 2019-11-14 ENCOUNTER — HOSPITAL ENCOUNTER (OUTPATIENT)
Dept: PSYCHOLOGY | Facility: CLINIC | Age: 64
Discharge: HOME | End: 2019-11-14
Payer: MEDICARE

## 2019-11-14 DIAGNOSIS — F43.23 ADJUSTMENT DISORDER WITH MIXED ANXIETY AND DEPRESSED MOOD: Primary | ICD-10-CM

## 2019-11-14 PROCEDURE — 90834 PSYTX W PT 45 MINUTES: CPT | Performed by: PSYCHOLOGIST

## 2019-11-14 ASSESSMENT — COGNITIVE AND FUNCTIONAL STATUS - GENERAL
THOUGHT_CONTENT: APPROPRIATE
EST. PREMORBID INTELLIGENCE: ABOVE AVERAGE
AROUSAL LEVEL: ALERT
ATTENTION: WNL
CONCENTRATION: WNL
AFFECT: TENSE
INSIGHT: INTACT
DELUSIONS: NONE OR AGE APPROPRIATE
EYE_CONTACT: WNL
REMOTE MEMORY: WNL
PSYCHOMOTOR FUNCTIONING: WNL
PERCEPTUAL FUNCTION: NORMAL
THOUGHT_PROCESS: WNL;WORRY
APPETITE: NO CHANGE
MOOD: MOTIVATED;HOPEFUL;ANXIOUS
ORIENTATION: FULLY ORIENTED
IMPULSE CONTROL: IMPAIRED, MINIMALLY
SPEECH: REGULAR
APPEARANCE: WELL GROOMED
RECENT MEMORY: WNL
SLEEP_WAKE_CYCLE: NO CHANGE

## 2019-11-14 NOTE — PROGRESS NOTES
Outpatient Psychology Progress Note    Duration: 45 minutes  Africa Cooper : 1955, a 64 y.o. female, for follow up visit.  Reason:  She has been experiencing adjustment issues.    HPI:   Chief Complaint   Patient presents with   • Psychotherapy   • Anxiety   • Depression       Current Evaluation:     Mental Status Evaluation:  Arousal Level: Alert  Appearance: Well Groomed  Speech: Regular  Psychomotor Functioning: WNL  Eye Contact: WNL  Est. Premorbid Intelligence: Above average  Orientation: Fully oriented  Attention: WNL  Concentration: WNL  Recent Memory: WNL  Remote Memory: WNL  Thought Content: Appropriate  Thought Process: WNL, Worry  Insight: Intact  Perceptual Function: Normal  Delusions: None or age appropriate  Sleeping: No Change  Appetite: No Change  Affect: Tense  Mood: Motivated, Hopeful, Anxious    Comments:      Additional Assessments done this visit:       Session Summary:   Pt c recent family stressors but able to see + side as well.  Session focused on boundary issues, enabling vs helping and establishing consequences. Pt maintains her diet in spite of stress and has lost an additional 7 lbs.      Interventions  Acceptance & Adjustment  Cognitive Behavior Training  Communication Skills development  Monitoring of Symptoms    Psychoeducation provided on:  Other: n/a     Recommendations:   Individual Therapy   45 minutes 1-2 times monthly    Goals     • Maximize adjustment and acceptance of limitations           Visit Diagnosis:     1. Adjustment disorder with mixed anxiety and depressed mood        Diagnostic Impression:        Psychological condition is generally improving       Shelly Le, PhD @ 4:05 PM

## 2019-12-20 ENCOUNTER — HOSPITAL ENCOUNTER (OUTPATIENT)
Dept: PSYCHOLOGY | Facility: CLINIC | Age: 64
Discharge: HOME | End: 2019-12-20
Payer: MEDICARE

## 2019-12-20 DIAGNOSIS — F43.23 ADJUSTMENT DISORDER WITH MIXED ANXIETY AND DEPRESSED MOOD: Primary | ICD-10-CM

## 2019-12-20 PROCEDURE — 90834 PSYTX W PT 45 MINUTES: CPT | Performed by: PSYCHOLOGIST

## 2019-12-20 ASSESSMENT — COGNITIVE AND FUNCTIONAL STATUS - GENERAL
PSYCHOMOTOR FUNCTIONING: FATIGUED
THOUGHT_CONTENT: APPROPRIATE
LIBIDO: NO CHANGE
THOUGHT_PROCESS: WNL
ATTENTION: WNL
IMPULSE CONTROL: IMPAIRED, MINIMALLY
APPEARANCE: WELL GROOMED
SPEECH: PRESSURED
ORIENTATION: FULLY ORIENTED
AROUSAL LEVEL: ALERT
RECENT MEMORY: WNL
AFFECT: TENSE
PERCEPTUAL FUNCTION: NORMAL
DELUSIONS: NONE OR AGE APPROPRIATE
EST. PREMORBID INTELLIGENCE: ABOVE AVERAGE
EYE_CONTACT: WNL
INSIGHT: INTACT
MOOD: MOTIVATED;HOPEFUL;ANXIOUS
SLEEP_WAKE_CYCLE: DECREASED;ONSET PROBLEM
CONCENTRATION: WNL
REMOTE MEMORY: WNL
APPETITE: NO CHANGE

## 2019-12-20 NOTE — PROGRESS NOTES
Outpatient Psychology Progress Note    Duration: 45 minutes  Africa Cooper : 1955, a 64 y.o. female, for follow up visit.  Reason:  She has been experiencing adjustment issues.    HPI:   Chief Complaint   Patient presents with   • Psychotherapy   • Anxiety   • Depression       Current Evaluation:     Mental Status Evaluation:  Arousal Level: Alert  Appearance: Well Groomed  Speech: Pressured  Psychomotor Functioning: Fatigued  Eye Contact: WNL  Est. Premorbid Intelligence: Above average  Orientation: Fully oriented  Attention: WNL  Concentration: WNL  Recent Memory: WNL  Remote Memory: WNL  Thought Content: Appropriate  Thought Process: WNL  Insight: Intact  Perceptual Function: Normal  Delusions: None or age appropriate  Sleeping: Decreased, Onset Problem  Appetite: No Change  Libido: No change  Affect: Tense  Mood: Motivated, Hopeful, Anxious    Comments:      Additional Assessments done this visit:       Session Summary:   Pt presents c tense affect and pressured speech.  Reports high number of holiday commitments.  Recognizes that she took on many on her own volition.  Explored possible reasons and how best to manage fatigue, diet and self care during the holiday season.      Interventions  Acceptance & Adjustment  Monitoring of Symptoms    Psychoeducation provided on:  Other: n/a     Recommendations:   Individual Therapy   45 minutes 2 times monthly    Goals     • Maximize adjustment and acceptance of limitations           Visit Diagnosis:     1. Adjustment disorder with mixed anxiety and depressed mood        Diagnostic Impression:        Psychological condition is generally worsening       Shelly Le, PhD @ 12:05 PM

## 2020-01-23 ENCOUNTER — HOSPITAL ENCOUNTER (OUTPATIENT)
Dept: PSYCHOLOGY | Facility: CLINIC | Age: 65
Discharge: HOME | End: 2020-01-23
Payer: MEDICARE

## 2020-01-23 DIAGNOSIS — F43.23 ADJUSTMENT DISORDER WITH MIXED ANXIETY AND DEPRESSED MOOD: Primary | ICD-10-CM

## 2020-01-23 PROCEDURE — 90834 PSYTX W PT 45 MINUTES: CPT | Performed by: PSYCHOLOGIST

## 2020-01-23 ASSESSMENT — COGNITIVE AND FUNCTIONAL STATUS - GENERAL
MOOD: ANXIOUS;MOTIVATED
SPEECH: REGULAR
THOUGHT_PROCESS: WNL
SLEEP_WAKE_CYCLE: DECREASED
EST. PREMORBID INTELLIGENCE: ABOVE AVERAGE
INSIGHT: INTACT
CONCENTRATION: WNL
APPETITE: NO CHANGE
PSYCHOMOTOR FUNCTIONING: WNL
AROUSAL LEVEL: ALERT
ORIENTATION: FULLY ORIENTED
APPEARANCE: WELL GROOMED
IMPULSE CONTROL: INTACT
ATTENTION: WNL
PERCEPTUAL FUNCTION: NORMAL
REMOTE MEMORY: WNL
EYE_CONTACT: WNL
RECENT MEMORY: WNL
THOUGHT_CONTENT: APPROPRIATE
AFFECT: FULL RANGE
LIBIDO: NO CHANGE

## 2020-01-23 NOTE — PROGRESS NOTES
Outpatient Psychology Progress Note    Duration: 45 minutes  Africa Cooper : 1955, a 64 y.o. female, for follow up visit.  Reason:  She has been experiencing adjustment issues.    HPI:   Chief Complaint   Patient presents with   • Psychotherapy   • Anxiety   • Depression       Current Evaluation:     Mental Status Evaluation:  Arousal Level: Alert  Appearance: Well Groomed  Speech: Regular  Psychomotor Functioning: WNL  Eye Contact: WNL  Est. Premorbid Intelligence: Above average  Orientation: Fully oriented  Attention: WNL  Concentration: WNL  Recent Memory: WNL  Remote Memory: WNL  Thought Content: Appropriate  Thought Process: WNL  Insight: Intact  Perceptual Function: Normal  Sleeping: Decreased(late bedtime)  Appetite: No Change  Libido: No change  Affect: Full Range  Mood: Anxious, Motivated    Comments:      Additional Assessments done this visit:       Session Summary:   Pt more social, c many recent + activities. starting to resume weight loss program that has been effective for her. Gets anxious c future planning but agrees that there is no need to make longterm decisions at present.    Pt has not been getting to bed at a reasonable time nor has she been exercising regularly.  Agreed to work on both prior to next session.      Interventions  Acceptance & Adjustment  Anxiety Reduction Techniques  Goal Setting  Monitoring of Symptoms    Psychoeducation provided on:  Other: n/a     Recommendations:   Individual Therapy   45 minutes 1-2 times monthly    Goals     • Maximize adjustment and acceptance of limitations           Visit Diagnosis:     1. Adjustment disorder with mixed anxiety and depressed mood        Diagnostic Impression:        Psychological condition is generally improving       Shelly Le, PhD @ 4:07 PM

## 2020-02-13 ENCOUNTER — HOSPITAL ENCOUNTER (OUTPATIENT)
Dept: PSYCHOLOGY | Facility: CLINIC | Age: 65
Discharge: HOME | End: 2020-02-13
Payer: MEDICARE

## 2020-02-13 DIAGNOSIS — F43.23 ADJUSTMENT DISORDER WITH MIXED ANXIETY AND DEPRESSED MOOD: Primary | ICD-10-CM

## 2020-02-13 PROCEDURE — 90834 PSYTX W PT 45 MINUTES: CPT | Performed by: PSYCHOLOGIST

## 2020-02-13 ASSESSMENT — COGNITIVE AND FUNCTIONAL STATUS - GENERAL
EYE_CONTACT: WNL
SLEEP_WAKE_CYCLE: DECREASED
RECENT MEMORY: WNL
LIBIDO: NO CHANGE
APPEARANCE: WELL GROOMED
IMPULSE CONTROL: INTACT
THOUGHT_PROCESS: WNL
ORIENTATION: FULLY ORIENTED
EST. PREMORBID INTELLIGENCE: ABOVE AVERAGE
SPEECH: REGULAR
CONCENTRATION: WNL
MOOD: HOPEFUL;MOTIVATED
AROUSAL LEVEL: ALERT
PSYCHOMOTOR FUNCTIONING: WNL
AFFECT: FULL RANGE;LABILE
THOUGHT_CONTENT: APPROPRIATE
APPETITE: NO CHANGE
REMOTE MEMORY: WNL
ATTENTION: WNL
DELUSIONS: NONE OR AGE APPROPRIATE
INSIGHT: INTACT
PERCEPTUAL FUNCTION: NORMAL

## 2020-02-13 NOTE — PROGRESS NOTES
Outpatient Psychology Progress Note    Duration: 45 minutes  Africa Cooper : 1955, a 64 y.o. female, for follow up visit.  Reason:  She has been experiencing adjustment issues.    HPI:   Chief Complaint   Patient presents with   • Psychotherapy   • Multiple Sclerosis       Current Evaluation:     Mental Status Evaluation:  Arousal Level: Alert  Appearance: Well Groomed  Speech: Regular  Psychomotor Functioning: WNL  Eye Contact: WNL  Est. Premorbid Intelligence: Above average  Orientation: Fully oriented  Attention: WNL  Concentration: WNL  Recent Memory: WNL  Remote Memory: WNL  Thought Content: Appropriate  Thought Process: WNL  Insight: Intact  Perceptual Function: Normal  Delusions: None or age appropriate  Sleeping: Decreased  Appetite: No Change  Libido: No change  Affect: Full Range, Labile  Mood: Hopeful, Motivated    Comments:        Additional Assessments done this visit:       Session Summary:   Pt c partial success on exercise and sleep goals.  Weight loss is inconsistent but pt more realistic about the variability even when following her diet and less focused on immediate results.    Pt taking steps to explore longstanding issues re adjustment and life satisfaction.  Using tools well and is emotionally more open.      Interventions  Acceptance & Adjustment  Goal Setting  Monitoring of Symptoms    Psychoeducation provided on:  Other: n/a     Recommendations:   Individual Therapy   45 minutes 2 times monthly    Goals     • Maximize adjustment and acceptance of limitations           Visit Diagnosis:     1. Adjustment disorder with mixed anxiety and depressed mood        Diagnostic Impression:        Psychological condition is generally improving       Shelly Le, PhD @ 4:20 PM

## 2020-03-05 ENCOUNTER — HOSPITAL ENCOUNTER (OUTPATIENT)
Dept: PSYCHOLOGY | Facility: CLINIC | Age: 65
Discharge: HOME | End: 2020-03-05
Payer: MEDICARE

## 2020-03-05 DIAGNOSIS — F43.23 ADJUSTMENT DISORDER WITH MIXED ANXIETY AND DEPRESSED MOOD: Primary | ICD-10-CM

## 2020-03-05 PROCEDURE — 90834 PSYTX W PT 45 MINUTES: CPT | Performed by: PSYCHOLOGIST

## 2020-03-05 ASSESSMENT — COGNITIVE AND FUNCTIONAL STATUS - GENERAL
SLEEP_WAKE_CYCLE: INCREASED
AROUSAL LEVEL: ALERT
CONCENTRATION: WNL
ORIENTATION: FULLY ORIENTED
APPEARANCE: WELL GROOMED
THOUGHT_CONTENT: APPROPRIATE
IMPULSE CONTROL: INTACT
EYE_CONTACT: WNL
INSIGHT: INTACT
REMOTE MEMORY: WNL
MOOD: HOPEFUL;MOTIVATED;ANXIOUS
ATTENTION: WNL
SPEECH: REGULAR
EST. PREMORBID INTELLIGENCE: ABOVE AVERAGE
PERCEPTUAL FUNCTION: NORMAL
THOUGHT_PROCESS: WNL
DELUSIONS: NONE OR AGE APPROPRIATE
APPETITE: NO CHANGE
PSYCHOMOTOR FUNCTIONING: WNL
RECENT MEMORY: WNL
LIBIDO: NO CHANGE

## 2020-03-05 NOTE — PROGRESS NOTES
Outpatient Psychology Progress Note    Duration: 45 minutes  Africa Cooper : 1955, a 64 y.o. female, for follow up visit.  Reason:  She has been experiencing adjustment issues    HPI:   Chief Complaint   Patient presents with   • Psychotherapy   • Multiple Sclerosis       Current Evaluation:     Mental Status Evaluation:  Arousal Level: Alert  Appearance: Well Groomed  Speech: Regular  Psychomotor Functioning: WNL  Eye Contact: WNL  Est. Premorbid Intelligence: Above average  Orientation: Fully oriented  Attention: WNL  Concentration: WNL  Recent Memory: WNL  Remote Memory: WNL  Thought Content: Appropriate  Thought Process: WNL  Insight: Intact  Perceptual Function: Normal  Delusions: None or age appropriate  Sleeping: Increased  Appetite: No Change  Libido: No change  Mood: Hopeful, Motivated, Anxious    Comments:        Additional Assessments done this visit:       Session Summary:   Pt feeling anxious, trying to make decisions about housing.  Pt finding it difficult to balance all the options and make the right choice. Political and global health issues also contribute to anxiety.      Pt feeling better p recent bout of diverticulitis.      Interventions  Acceptance & Adjustment  Monitoring of Symptoms    Psychoeducation provided on:  Depression Anxiety     Recommendations:   Individual Therapy   45 minutes 1-2 times monthly    Goals     • Maximize adjustment and acceptance of limitations           Visit Diagnosis:     1. Adjustment disorder with mixed anxiety and depressed mood        Diagnostic Impression:        Psychological condition is generally improving       Shelly Le, PhD @ 4:15 PM

## 2020-04-09 ENCOUNTER — HOSPITAL ENCOUNTER (OUTPATIENT)
Dept: PSYCHOLOGY | Facility: CLINIC | Age: 65
Discharge: HOME | End: 2020-04-09
Payer: MEDICARE

## 2020-04-09 DIAGNOSIS — F43.23 ADJUSTMENT DISORDER WITH MIXED ANXIETY AND DEPRESSED MOOD: Primary | ICD-10-CM

## 2020-04-09 PROCEDURE — 90834 PSYTX W PT 45 MINUTES: CPT | Mod: 95 | Performed by: PSYCHOLOGIST

## 2020-04-09 ASSESSMENT — COGNITIVE AND FUNCTIONAL STATUS - GENERAL
PERCEPTUAL FUNCTION: PAIN
REMOTE MEMORY: WNL
PSYCHOMOTOR FUNCTIONING: WNL
ATTENTION: WNL
AROUSAL LEVEL: ALERT
APPEARANCE: WELL GROOMED
THOUGHT_CONTENT: APPROPRIATE
APPETITE: NO CHANGE
EST. PREMORBID INTELLIGENCE: ABOVE AVERAGE
SLEEP_WAKE_CYCLE: NO CHANGE
EYE_CONTACT: WNL
SPEECH: REGULAR
ORIENTATION: FULLY ORIENTED
DELUSIONS: NONE OR AGE APPROPRIATE
RECENT MEMORY: WNL
INSIGHT: INTACT
AFFECT: FULL RANGE
LIBIDO: NO CHANGE
IMPULSE CONTROL: INTACT
MOOD: ANXIOUS;APATHETIC
CONCENTRATION: WNL
THOUGHT_PROCESS: WNL

## 2020-04-09 NOTE — PROGRESS NOTES
Outpatient Psychology Progress Note    Duration: 45 minutes  Africa Cooper : 1955, a 64 y.o. female, for follow up visit.  Reason:  She has been experiencing adjustment issues.    HPI:   Chief Complaint   Patient presents with   • Psychotherapy   • Multiple Sclerosis       Current Evaluation:     Mental Status Evaluation:  Arousal Level: Alert  Appearance: Well Groomed  Speech: Regular  Psychomotor Functioning: WNL  Eye Contact: WNL  Est. Premorbid Intelligence: Above average  Orientation: Fully oriented  Attention: WNL  Concentration: WNL  Recent Memory: WNL  Remote Memory: WNL  Thought Content: Appropriate  Thought Process: WNL  Insight: Intact  Perceptual Function: Pain  Delusions: None or age appropriate  Sleeping: No Change  Appetite: No Change  Libido: No change  Affect: Full Range  Mood: Anxious, Apathetic    Comments:        Additional Assessments done this visit:     [place] Suicide Severity Rating Scale:  Not done today       Session Summary:   Pt coping appropriately s stressors associated c covid 19.  She has been intermittently caregiving for her 2 grandchildren, off from school/.  Pt careful about maintaining precautions.  Is concerned re family/friends who live in [place] area but all are ok at present.    Pt did have fall while walking outdoors in flip flops.  She struck her head and has a bruise.  Pt did receive medical attention and showed no concussion sx except for ?nystagmus.  She feels she is ok a t present.    Pt generally maintaining diet.  Mild loss of interest in one longtime activity.      Interventions  Acceptance & Adjustment  Monitoring of Symptoms    Psychoeducation provided on:  Other: n/a     Recommendations:   Individual Therapy   45 minutes 2 times monthly    Goals     • Maximize adjustment and acceptance of limitations           Visit Diagnosis:     1. Adjustment disorder with mixed anxiety and depressed mood        Diagnostic Impression:        Psychological condition is  generally improving       Shelly Le, PhD @ 3:57 PM

## 2020-04-09 NOTE — PROGRESS NOTES
Request for Consent  Patient provided verbal consent to treat via telemedicine using [employer]. Clinician introduced [employer] as a secure, telemedicine platform that we are utilizing to provide care during the COVID-19 pandemic. Patient understands the session will be billed to their insurance or patient directly.  Patient was informed only the patient and the clinician are permitted on?the video conference, sessions are not recorded by the clinician, and the patient is not permitted to record the session.? Patient was provided clinician's unique meeting ID prior to session, patient was asked to arrive to virtual session on time just as patient would if we were in the office. Clinician confirmed identification of patient by name and birthdate, provider name, location of patient and clinician, and callback number in case disconnected.  Patient Response to Request for Consent: Yes  Visit Type performed: Audio and Visual

## 2020-04-23 ENCOUNTER — HOSPITAL ENCOUNTER (OUTPATIENT)
Dept: PSYCHOLOGY | Facility: CLINIC | Age: 65
Discharge: HOME | End: 2020-04-23
Payer: MEDICARE

## 2020-04-23 DIAGNOSIS — F43.23 ADJUSTMENT DISORDER WITH MIXED ANXIETY AND DEPRESSED MOOD: Primary | ICD-10-CM

## 2020-04-23 PROCEDURE — 90834 PSYTX W PT 45 MINUTES: CPT | Mod: 95 | Performed by: PSYCHOLOGIST

## 2020-04-23 ASSESSMENT — COGNITIVE AND FUNCTIONAL STATUS - GENERAL
APPEARANCE: WELL GROOMED
ORIENTATION: FULLY ORIENTED
PERCEPTUAL FUNCTION: PAIN
APPETITE: INCREASED;DECREASED
THOUGHT_PROCESS: WNL
REMOTE MEMORY: WNL
SLEEP_WAKE_CYCLE: NO CHANGE
PSYCHOMOTOR FUNCTIONING: WNL
RECENT MEMORY: WNL
AFFECT: FULL RANGE
AROUSAL LEVEL: ALERT
SPEECH: REGULAR
LIBIDO: NO CHANGE
EYE_CONTACT: WNL
MOOD: EUTHYMIC (NORMAL)
THOUGHT_CONTENT: APPROPRIATE
ATTENTION: WNL
IMPULSE CONTROL: INTACT
DELUSIONS: NONE OR AGE APPROPRIATE
EST. PREMORBID INTELLIGENCE: ABOVE AVERAGE
INSIGHT: INTACT

## 2020-04-23 NOTE — PROGRESS NOTES
Inpatient Psychology Progress Note    Duration: 45 minutes  Africa Cooper : 1955, a 64 y.o. female, for follow up visit.  Reason:  She has been experiencing adjustment issues.    HPI:   Chief Complaint   Patient presents with   • Psychotherapy       Current Evaluation:     Mental Status Evaluation:  Arousal Level: Alert  Appearance: Well Groomed  Speech: Regular  Psychomotor Functioning: WNL  Eye Contact: WNL  Est. Premorbid Intelligence: Above average  Orientation: Fully oriented  Attention: WNL  Recent Memory: WNL  Remote Memory: WNL  Thought Content: Appropriate  Thought Process: WNL  Insight: Intact  Perceptual Function: Pain  Delusions: None or age appropriate  Sleeping: No Change(change in schedule )  Appetite: Increased, Decreased(generally increased but currently less due to diverticulitis)  Libido: No change  Affect: Full Range  Mood: Euthymic (normal)    Comments:      Additional Assessments done this visit:     [place] Suicide Severity Rating Scale:  Not done today       Session Summary:   Pt generally doing well although feeling ill today due to apparent attack of diverticulitis.  Seeking medical help quickly and appropriately.    Pt handling limited activity well.  Notes some overeating and lack of exercise but is not concerned. Sleep schedule changed but pt does not have errands or activities so staying up late/sleeping in does not have a negative impact.     Pt less eager to participate in committees and other opportunities online but content c current level of involvement. Sees family when able.      Interventions  Acceptance & Adjustment  Monitoring of Symptoms    Psychoeducation provided on:  Other: n/a     Recommendations:   Individual Therapy   45 minutes 2 times monthly    Goals     • Maximize adjustment and acceptance of limitations           Visit Diagnosis:     1. Adjustment disorder with mixed anxiety and depressed mood        Diagnostic Impression:        Psychological condition is  generally improving       Shelly Le, PhD @ 3:57 PM

## 2020-04-23 NOTE — PROGRESS NOTES
Request for Consent  Patient provided verbal consent to treat via telemedicine using [employer]. Clinician introduced [employer] as a secure, telemedicine platform that we are utilizing to provide care during the COVID-19 pandemic. Patient understands the session will be billed to their insurance or patient directly.  Patient was informed only the patient and the clinician are permitted on?the video conference, sessions are not recorded by the clinician, and the patient is not permitted to record the session.? Patient was provided clinician's unique meeting ID prior to session, patient was asked to arrive to virtual session on time just as patient would if we were in the office. Clinician confirmed identification of patient by name and birthdate, provider name, location of patient and clinician, and callback number in case disconnected.  Patient Response to Request for Consent: Yes  Visit Type performed: Audio and Video

## 2020-05-05 ENCOUNTER — APPOINTMENT (OUTPATIENT)
Dept: URBAN - METROPOLITAN AREA CLINIC 200 | Age: 65
Setting detail: DERMATOLOGY
End: 2020-05-06

## 2020-05-05 DIAGNOSIS — L82.1 OTHER SEBORRHEIC KERATOSIS: ICD-10-CM

## 2020-05-05 DIAGNOSIS — L57.8 OTHER SKIN CHANGES DUE TO CHRONIC EXPOSURE TO NONIONIZING RADIATION: ICD-10-CM

## 2020-05-05 PROBLEM — D23.5 OTHER BENIGN NEOPLASM OF SKIN OF TRUNK: Status: ACTIVE | Noted: 2020-05-05

## 2020-05-05 PROCEDURE — OTHER COUNSELING: OTHER

## 2020-05-05 PROCEDURE — OTHER REASSURANCE: OTHER

## 2020-05-05 PROCEDURE — 99213 OFFICE O/P EST LOW 20 MIN: CPT

## 2020-05-05 ASSESSMENT — LOCATION ZONE DERM: LOCATION ZONE: TRUNK

## 2020-05-05 ASSESSMENT — LOCATION SIMPLE DESCRIPTION DERM
LOCATION SIMPLE: CHEST
LOCATION SIMPLE: ABDOMEN

## 2020-05-05 ASSESSMENT — LOCATION DETAILED DESCRIPTION DERM
LOCATION DETAILED: LEFT MEDIAL SUPERIOR CHEST
LOCATION DETAILED: LEFT LATERAL ABDOMEN

## 2020-05-07 ENCOUNTER — HOSPITAL ENCOUNTER (OUTPATIENT)
Dept: PSYCHOLOGY | Facility: CLINIC | Age: 65
Discharge: HOME | End: 2020-05-07
Payer: MEDICARE

## 2020-05-07 DIAGNOSIS — F43.23 ADJUSTMENT DISORDER WITH MIXED ANXIETY AND DEPRESSED MOOD: Primary | ICD-10-CM

## 2020-05-07 PROCEDURE — 90834 PSYTX W PT 45 MINUTES: CPT | Mod: 95 | Performed by: PSYCHOLOGIST

## 2020-05-07 ASSESSMENT — COGNITIVE AND FUNCTIONAL STATUS - GENERAL
EYE_CONTACT: WNL
AFFECT: FULL RANGE
ORIENTATION: FULLY ORIENTED
IMPULSE CONTROL: INTACT
APPEARANCE: WELL GROOMED
SLEEP_WAKE_CYCLE: INCREASED
THOUGHT_PROCESS: WNL
THOUGHT_CONTENT: APPROPRIATE
PERCEPTUAL FUNCTION: NORMAL
SPEECH: REGULAR
MOOD: EUTHYMIC (NORMAL)
EST. PREMORBID INTELLIGENCE: ABOVE AVERAGE
AROUSAL LEVEL: ALERT
APPETITE: INCREASED;DECREASED
CONCENTRATION: WNL
PSYCHOMOTOR FUNCTIONING: WNL
DELUSIONS: NONE OR AGE APPROPRIATE
LIBIDO: NO CHANGE
ATTENTION: WNL
REMOTE MEMORY: WNL
RECENT MEMORY: WNL
INSIGHT: INTACT

## 2020-05-07 NOTE — PROGRESS NOTES
Outpatient Psychology Progress Note    Duration: 45 minutes  Africa Cooper : 1955, a 64 y.o. female, for follow up visit.  Reason:  She has been experiencing adjustment issues.    HPI:   Chief Complaint   Patient presents with   • Psychotherapy   • Multiple Sclerosis       Current Evaluation:     Mental Status Evaluation:  Arousal Level: Alert  Appearance: Well Groomed  Speech: Regular  Psychomotor Functioning: WNL  Eye Contact: WNL  Est. Premorbid Intelligence: Above average  Orientation: Fully oriented  Attention: WNL  Concentration: WNL  Recent Memory: WNL  Remote Memory: WNL  Thought Content: Appropriate  Thought Process: WNL  Insight: Intact  Perceptual Function: Normal  Delusions: None or age appropriate  Sleeping: Increased  Appetite: Increased, Decreased  Libido: No change  Affect: Full Range  Mood: Euthymic (normal)    Comments:      Additional Assessments done this visit:     [place] Suicide Severity Rating Scale:  Not done today       Session Summary:   Pt reports a series of health issues in past 2 weeks, now resolved.  Had negative reaction to antibx but nausea is now gone.  Pt does c/o fatigue, is generally taking appropriate measures to manage it.      Pt frustrated by ongoing limitations in social contact, makes reasonable decisions re time c her children and grandchildren.      Overall, doing ok.      Interventions  Acceptance & Adjustment  Monitoring of Symptoms    Psychoeducation provided on:  Other: n/a     Recommendations:   Individual Therapy   45 minutes 2 times monthly    Goals     • Maximize adjustment and acceptance of limitations           Visit Diagnosis:     1. Adjustment disorder with mixed anxiety and depressed mood        Diagnostic Impression:        Psychological condition is generally showing no change       Shelly Le, PhD @ 3:55 PM

## 2020-05-07 NOTE — PROGRESS NOTES
Request for Consent  Patient provided verbal consent to treat via telemedicine using [employer]. Clinician introduced [employer] as a secure, telemedicine platform that we are utilizing to provide care during the COVID-19 pandemic. Patient understands the session will be billed to their insurance or patient directly.  Patient was informed only the patient and the clinician are permitted on?the video conference, sessions are not recorded by the clinician, and the patient is not permitted to record the session.? Patient was provided clinician's unique meeting ID prior to session, patient was asked to arrive to virtual session on time just as patient would if we were in the office. Clinician confirmed identification of patient by name and birthdate, provider name, location of patient and clinician, and callback number in case disconnected.  Patient Response to Request for Consent: Yes  Visit Type performed: Audio and Video

## 2020-05-28 ENCOUNTER — HOSPITAL ENCOUNTER (OUTPATIENT)
Dept: PSYCHOLOGY | Facility: CLINIC | Age: 65
Discharge: HOME | End: 2020-05-28
Payer: MEDICARE

## 2020-05-28 DIAGNOSIS — F43.23 ADJUSTMENT DISORDER WITH MIXED ANXIETY AND DEPRESSED MOOD: Primary | ICD-10-CM

## 2020-05-28 PROCEDURE — 90834 PSYTX W PT 45 MINUTES: CPT | Mod: 95 | Performed by: PSYCHOLOGIST

## 2020-05-28 ASSESSMENT — COGNITIVE AND FUNCTIONAL STATUS - GENERAL
MOOD: FRUSTRATED;ANXIOUS
EYE_CONTACT: WNL
LIBIDO: NO CHANGE
THOUGHT_PROCESS: WNL
AROUSAL LEVEL: ALERT
EST. PREMORBID INTELLIGENCE: ABOVE AVERAGE
INSIGHT: INTACT
IMPULSE CONTROL: INTACT
DELUSIONS: NONE OR AGE APPROPRIATE
AFFECT: FULL RANGE
THOUGHT_CONTENT: APPROPRIATE
SLEEP_WAKE_CYCLE: INCREASED
REMOTE MEMORY: WNL
RECENT MEMORY: WNL
PSYCHOMOTOR FUNCTIONING: WNL
ORIENTATION: FULLY ORIENTED
APPEARANCE: WELL GROOMED
SPEECH: REGULAR
CONCENTRATION: WNL
ATTENTION: WNL
APPETITE: DECREASED
PERCEPTUAL FUNCTION: PAIN

## 2020-05-28 NOTE — PROGRESS NOTES
Request for Consent  Patient provided verbal consent to treat via telemedicine. Clinician introduced the secure telemedicine platform that we are utilizing to provide care during the COVID-19 pandemic. Patient understands the session will be billed to their insurance or patient directly.  Patient was informed only the patient and the clinician are permitted on?the video conference, sessions are not recorded by the clinician, and the patient is not permitted to record the session.? Patient was provided clinician's unique meeting ID prior to session, patient was asked to arrive to virtual session on time just as patient would if we were in the office. Clinician confirmed identification of patient by name and birthdate, provider name, location of patient and clinician, and callback number in case disconnected.  Patient Response to Request for Consent: Yes  Visit Type performed: Audio and Video

## 2020-05-28 NOTE — PROGRESS NOTES
"Outpatient Psychology Progress Note    Duration: 45 minutes  Africa Cooper : 1955, a 64 y.o. female, for follow up visit.  Reason:  She has been experiencing adjustment issues.    HPI:   Chief Complaint   Patient presents with   • Psychotherapy       Current Evaluation:     Mental Status Evaluation:  Arousal Level: Alert  Appearance: Well Groomed  Speech: Regular  Psychomotor Functioning: WNL  Eye Contact: WNL  Est. Premorbid Intelligence: Above average  Orientation: Fully oriented  Attention: WNL  Concentration: WNL  Recent Memory: WNL  Remote Memory: WNL  Thought Content: Appropriate  Thought Process: WNL  Insight: Intact  Perceptual Function: Pain  Delusions: None or age appropriate  Sleeping: Increased  Appetite: Decreased  Libido: No change  Affect: Full Range  Mood: Frustrated, Anxious    Comments:        Additional Assessments done this visit:     [place] Suicide Severity Rating Scale:  Not done today       Session Summary:   Pt c apparent 3rd episode of diverticulitis in 4 mos; frustrated but addressing c GI dr appropriately.  Fatigue and appetite are impacted and pt reports some abdominal pain.     Pt taking + steps to return to meaningful [place] activities.  Uses good perspective of being motivated by others rather than negatively comparing herself c others who are more spiritual or otherwise doing \"better\" than she is.      Pt maintaining good boundaries re family.      Interventions  Acceptance & Adjustment  Goal Setting  Monitoring of Symptoms    Psychoeducation provided on:  Other: n/a     Recommendations:   Individual Therapy   45 minutes 2 times monthly    Goals     • Maximize adjustment and acceptance of limitations           Visit Diagnosis:     1. Adjustment disorder with mixed anxiety and depressed mood        Diagnostic Impression:        Psychological condition is generally improving       Shelly Le, PhD @ 4:01 PM  "

## 2020-06-11 ENCOUNTER — HOSPITAL ENCOUNTER (OUTPATIENT)
Dept: PSYCHOLOGY | Facility: CLINIC | Age: 65
Discharge: HOME | End: 2020-06-11
Payer: MEDICARE

## 2020-06-11 DIAGNOSIS — F43.23 ADJUSTMENT DISORDER WITH MIXED ANXIETY AND DEPRESSED MOOD: Primary | ICD-10-CM

## 2020-06-11 PROCEDURE — 90834 PSYTX W PT 45 MINUTES: CPT | Mod: 95 | Performed by: PSYCHOLOGIST

## 2020-06-11 ASSESSMENT — COGNITIVE AND FUNCTIONAL STATUS - GENERAL
THOUGHT_CONTENT: APPROPRIATE
AROUSAL LEVEL: ALERT
SLEEP_WAKE_CYCLE: NO CHANGE
REMOTE MEMORY: WNL
LIBIDO: NO CHANGE
APPETITE: INCREASED
CONCENTRATION: WNL
THOUGHT_PROCESS: WNL
ORIENTATION: FULLY ORIENTED
APPEARANCE: WELL GROOMED
INSIGHT: INTACT
PSYCHOMOTOR FUNCTIONING: WNL
MOOD: EUTHYMIC (NORMAL);HOPEFUL
DELUSIONS: NONE OR AGE APPROPRIATE
AFFECT: FULL RANGE
ATTENTION: WNL
IMPULSE CONTROL: INTACT
PERCEPTUAL FUNCTION: NORMAL
SPEECH: REGULAR
EYE_CONTACT: WNL
EST. PREMORBID INTELLIGENCE: ABOVE AVERAGE
RECENT MEMORY: WNL

## 2020-06-11 NOTE — PROGRESS NOTES
Outpatient Psychology Progress Note    Duration: 50 minutes  Africa Cooper : 1955, a 64 y.o. female, for follow up visit.  Reason:  She has been experiencing adjustment issues.    HPI:   Chief Complaint   Patient presents with   • Psychotherapy   • Anxiety   • Depression       Current Evaluation:     Mental Status Evaluation:  Arousal Level: Alert  Appearance: Well Groomed  Speech: Regular  Psychomotor Functioning: WNL  Eye Contact: WNL  Est. Premorbid Intelligence: Above average  Orientation: Fully oriented  Attention: WNL  Concentration: WNL  Recent Memory: WNL  Remote Memory: WNL  Thought Content: Appropriate  Thought Process: WNL  Insight: Intact  Perceptual Function: Normal  Delusions: None or age appropriate  Sleeping: No Change  Appetite: Increased  Libido: No change  Affect: Full Range  Mood: Euthymic (normal), Hopeful    Comments:      Additional Assessments done this visit:     [place] Suicide Severity Rating Scale:  Not done today       Session Summary:   Session conducted both via video and c audio only due to tech difficulties.     Pt c improved physical and emotional functioning.  Making good decisions and maintaining good boundaries.  Has been briefly tearful, considering impact of corona virus on herself and grandchildren.  Encouraging pt to consider + aspects, such as more time at home and around family helpful in broadening pt's outlook.     Pt recommitting to diet plan. Diverticulitis has resolved.     Interventions  Acceptance & Adjustment  Monitoring of Symptoms    Psychoeducation provided on:  Other: n/a     Recommendations:   Individual Therapy   45 minutes 2 times monthly    Goals     • Maximize adjustment and acceptance of limitations           Visit Diagnosis:     1. Adjustment disorder with mixed anxiety and depressed mood        Diagnostic Impression:        Psychological condition is generally improving       Shelly Le, PhD @ 4:00 PM

## 2020-07-02 ENCOUNTER — HOSPITAL ENCOUNTER (OUTPATIENT)
Dept: PSYCHOLOGY | Facility: CLINIC | Age: 65
Discharge: HOME | End: 2020-07-02
Payer: MEDICARE

## 2020-07-02 DIAGNOSIS — F43.23 ADJUSTMENT DISORDER WITH MIXED ANXIETY AND DEPRESSED MOOD: Primary | ICD-10-CM

## 2020-07-02 PROCEDURE — 90834 PSYTX W PT 45 MINUTES: CPT | Mod: 95 | Performed by: PSYCHOLOGIST

## 2020-07-02 ASSESSMENT — COGNITIVE AND FUNCTIONAL STATUS - GENERAL
ATTENTION: WNL
EST. PREMORBID INTELLIGENCE: ABOVE AVERAGE
EYE_CONTACT: WNL
CONCENTRATION: WNL
REMOTE MEMORY: WNL
RECENT MEMORY: WNL
APPEARANCE: OTHER:
LIBIDO: NO CHANGE
SLEEP_WAKE_CYCLE: NO CHANGE
MOOD: HOPEFUL;EUTHYMIC (NORMAL)
PSYCHOMOTOR FUNCTIONING: WNL
THOUGHT_CONTENT: APPROPRIATE
ORIENTATION: FULLY ORIENTED
SPEECH: REGULAR
AROUSAL LEVEL: ALERT
APPETITE: NO CHANGE
DELUSIONS: NONE OR AGE APPROPRIATE
THOUGHT_PROCESS: WNL
INSIGHT: INTACT
IMPULSE CONTROL: INTACT
PERCEPTUAL FUNCTION: NORMAL
AFFECT: FULL RANGE

## 2020-07-02 NOTE — PROGRESS NOTES
Outpatient Psychology Progress Note    Duration: 45 minutes  Africa Cooper : 1955, a 64 y.o. female, for follow up visit.  Reason:  She has been experiencing adjustment issues.    HPI:   Chief Complaint   Patient presents with   • Psychotherapy   • Multiple Sclerosis       Current Evaluation:     Mental Status Evaluation:  Arousal Level: Alert  Appearance: Other:(pt seen briefly on video but session mostly audio only due to video failure)  Speech: Regular  Psychomotor Functioning: WNL  Eye Contact: WNL  Est. Premorbid Intelligence: Above average  Orientation: Fully oriented  Attention: WNL  Concentration: WNL  Recent Memory: WNL  Remote Memory: WNL  Thought Content: Appropriate  Thought Process: WNL  Insight: Intact  Perceptual Function: Normal  Delusions: None or age appropriate  Sleeping: No Change  Appetite: No Change  Libido: No change  Affect: Full Range  Mood: Hopeful, Euthymic (normal)    Comments:        Additional Assessments done this visit:     [place] Suicide Severity Rating Scale:  Not done today       Session Summary:   Telehealth session initiated but converted to audio only p video failure about 10 mins into session.    Pt c bright affect and mood, excited by finding new home near [facility].  Pt plans to relocate around .  Shows good decision making and planning.      Interventions  Goal Setting  Monitoring of Symptoms    Psychoeducation provided on:  Other: n/a     Recommendations:   Individual Therapy   45 minutes 1-2 times monthly    Goals     • Maximize adjustment and acceptance of limitations           Visit Diagnosis:     1. Adjustment disorder with mixed anxiety and depressed mood        Diagnostic Impression:        Psychological condition is generally improving       Shelly Le, PhD @ 3:56 PM

## 2020-07-02 NOTE — PROGRESS NOTES
Request for Consent  Patient provided verbal consent to treat via telemedicine. Clinician introduced the secure telemedicine platform that we are utilizing to provide care during the COVID-19 pandemic. Patient understands the session will be billed to their insurance or patient directly.  Patient was informed only the patient and the clinician are permitted on?the video conference, sessions are not recorded by the clinician, and the patient is not permitted to record the session.? Patient was provided clinician's unique meeting ID prior to session, patient was asked to arrive to virtual session on time just as patient would if we were in the office. Clinician confirmed identification of patient by name and birthdate, provider name, location of patient and clinician, and callback number in case disconnected.  Patient Response to Request for Consent: Yes  Visit Type performed: Audio Only

## 2020-07-30 ENCOUNTER — HOSPITAL ENCOUNTER (OUTPATIENT)
Dept: PSYCHOLOGY | Facility: CLINIC | Age: 65
Discharge: HOME | End: 2020-07-30
Payer: MEDICARE

## 2020-07-30 DIAGNOSIS — F43.23 ADJUSTMENT DISORDER WITH MIXED ANXIETY AND DEPRESSED MOOD: Primary | ICD-10-CM

## 2020-07-30 PROCEDURE — 90834 PSYTX W PT 45 MINUTES: CPT | Mod: 95 | Performed by: PSYCHOLOGIST

## 2020-07-30 ASSESSMENT — COGNITIVE AND FUNCTIONAL STATUS - GENERAL
AROUSAL LEVEL: ALERT
DELUSIONS: NONE OR AGE APPROPRIATE
SLEEP_WAKE_CYCLE: NO CHANGE
ATTENTION: WNL
SPEECH: REGULAR
RECENT MEMORY: WNL
EYE_CONTACT: WNL
INSIGHT: INTACT
THOUGHT_CONTENT: APPROPRIATE
IMPULSE CONTROL: INTACT
APPETITE: NO CHANGE
THOUGHT_PROCESS: WNL
AFFECT: FULL RANGE
CONCENTRATION: WNL
PSYCHOMOTOR FUNCTIONING: WNL
ORIENTATION: FULLY ORIENTED
APPEARANCE: WELL GROOMED
LIBIDO: NO CHANGE
PERCEPTUAL FUNCTION: NORMAL
EST. PREMORBID INTELLIGENCE: ABOVE AVERAGE
REMOTE MEMORY: WNL

## 2020-07-30 NOTE — PROGRESS NOTES
Outpatient Psychology Progress Note    Duration: 50 minutes  Africa Cooper : 1955, a 64 y.o. female, for follow up visit.  Reason:  She has been experiencing adjustment issues.    HPI:   Chief Complaint   Patient presents with   • Psychotherapy   • Multiple Sclerosis       Current Evaluation:     Mental Status Evaluation:  Arousal Level: Alert  Appearance: Well Groomed  Speech: Regular  Psychomotor Functioning: WNL  Eye Contact: WNL  Est. Premorbid Intelligence: Above average  Orientation: Fully oriented  Attention: WNL  Concentration: WNL  Recent Memory: WNL  Remote Memory: WNL  Thought Content: Appropriate  Thought Process: WNL  Insight: Intact  Perceptual Function: Normal  Delusions: None or age appropriate  Sleeping: No Change  Appetite: No Change  Libido: No change  Affect: Full Range  Mood: Hopeful, Euthymic (normal), Other:(grieving)    Comments:      Additional Assessments done this visit:     [place] Suicide Severity Rating Scale:  Not done today       Session Summary:   Session focused on family issues raised by father's sudden death. Pt handling well, grieving.    Session started c audio and video, converted to audio only due to tech difficulties.      Interventions  Acceptance & Adjustment  Monitoring of Symptoms    Psychoeducation provided on:  Other: grief     Recommendations:   Individual Therapy   45 minutes 1-2 times monthly    Goals     • Maximize adjustment and acceptance of limitations           Visit Diagnosis:     1. Adjustment disorder with mixed anxiety and depressed mood        Diagnostic Impression:        Psychological condition is generally improving       Shelly Le, PhD @ 3:49 PM

## 2020-08-13 ENCOUNTER — HOSPITAL ENCOUNTER (OUTPATIENT)
Dept: PSYCHOLOGY | Facility: CLINIC | Age: 65
End: 2020-08-13
Payer: MEDICARE

## 2020-08-27 ENCOUNTER — HOSPITAL ENCOUNTER (OUTPATIENT)
Dept: PSYCHOLOGY | Facility: CLINIC | Age: 65
Discharge: HOME | End: 2020-08-27
Payer: MEDICARE

## 2020-08-27 DIAGNOSIS — F43.23 ADJUSTMENT DISORDER WITH MIXED ANXIETY AND DEPRESSED MOOD: Primary | ICD-10-CM

## 2020-08-27 PROCEDURE — 90834 PSYTX W PT 45 MINUTES: CPT | Mod: 95 | Performed by: PSYCHOLOGIST

## 2020-08-27 ASSESSMENT — COGNITIVE AND FUNCTIONAL STATUS - GENERAL
SPEECH: REGULAR
ORIENTATION: FULLY ORIENTED
AROUSAL LEVEL: ALERT
IMPULSE CONTROL: INTACT
CONCENTRATION: WNL
APPETITE: INCREASED
EST. PREMORBID INTELLIGENCE: ABOVE AVERAGE
DELUSIONS: NONE OR AGE APPROPRIATE
SLEEP_WAKE_CYCLE: NO CHANGE
PERCEPTUAL FUNCTION: NORMAL
THOUGHT_PROCESS: WNL
AFFECT: FULL RANGE
EYE_CONTACT: WNL
ATTENTION: WNL
MOOD: FRUSTRATED
LIBIDO: NO CHANGE
THOUGHT_CONTENT: APPROPRIATE
APPEARANCE: WELL GROOMED
REMOTE MEMORY: WNL
INSIGHT: INTACT
PSYCHOMOTOR FUNCTIONING: WNL
RECENT MEMORY: WNL

## 2020-08-27 NOTE — PROGRESS NOTES
Outpatient Psychology Progress Note    Duration: 45 minutes  Africa Cooper : 1955, a 64 y.o. female, for follow up visit.  Reason:  She has been experiencing adjustment issues  HPI:   Chief Complaint   Patient presents with   • Psychotherapy   • Anxiety   • Depression       Current Evaluation:     Mental Status Evaluation:  Arousal Level: Alert  Appearance: Well Groomed  Speech: Regular  Psychomotor Functioning: WNL  Eye Contact: WNL  Est. Premorbid Intelligence: Above average  Orientation: Fully oriented  Attention: WNL  Concentration: WNL  Recent Memory: WNL  Remote Memory: WNL  Thought Content: Appropriate  Thought Process: WNL  Insight: Intact  Perceptual Function: Normal  Delusions: None or age appropriate  Sleeping: No Change  Appetite: Increased  Libido: No change  Affect: Full Range  Mood: Frustrated    Comments:    Additional Assessments done this visit:     [place] Suicide Severity Rating Scale:  Not done today       Session Summary:   Pt over eating in evening, a familiar pattern. Explored current stressors , including father's death, upcoming move and dissatisfaction c present diet plan.  Pt will be away for a few days, which may encourage reset of her behavior. Pt c ambivalent feelings about move but mostly +.     Session conducted by phone due to computer problems on pt's end.            Interventions  Acceptance & Adjustment  Monitoring of Symptoms  Self Regulation Strategies    Psychoeducation provided on:  Other: n/a     Recommendations:   Individual Therapy   45 minutes 2 times monthly    Goals     • Maximize adjustment and acceptance of limitations           Visit Diagnosis:     1. Adjustment disorder with mixed anxiety and depressed mood        Diagnostic Impression:        Psychological condition is generally improving       Shelly Le, PhD @ 3:28 PM

## 2020-09-10 ENCOUNTER — HOSPITAL ENCOUNTER (OUTPATIENT)
Dept: PSYCHOLOGY | Facility: CLINIC | Age: 65
End: 2020-09-10
Payer: MEDICARE

## 2020-10-01 ENCOUNTER — HOSPITAL ENCOUNTER (OUTPATIENT)
Dept: PSYCHOLOGY | Facility: CLINIC | Age: 65
Discharge: HOME | End: 2020-10-01
Payer: MEDICARE

## 2020-10-01 DIAGNOSIS — F43.23 ADJUSTMENT DISORDER WITH MIXED ANXIETY AND DEPRESSED MOOD: Primary | ICD-10-CM

## 2020-10-01 PROCEDURE — 90834 PSYTX W PT 45 MINUTES: CPT | Mod: 95 | Performed by: PSYCHOLOGIST

## 2020-10-01 ASSESSMENT — COGNITIVE AND FUNCTIONAL STATUS - GENERAL
SPEECH: REGULAR
REMOTE MEMORY: WNL
LIBIDO: DECREASED
AROUSAL LEVEL: ALERT
PERCEPTUAL FUNCTION: NORMAL
EYE_CONTACT: WNL
DELUSIONS: NONE OR AGE APPROPRIATE
MOOD: DEPRESSED
INSIGHT: INTACT
CONCENTRATION: WNL
IMPULSE CONTROL: INTACT
RECENT MEMORY: WNL
AFFECT: LABILE
EST. PREMORBID INTELLIGENCE: ABOVE AVERAGE
SLEEP_WAKE_CYCLE: NO CHANGE
ORIENTATION: FULLY ORIENTED
THOUGHT_CONTENT: APPROPRIATE
THOUGHT_PROCESS: WNL
APPEARANCE: WELL GROOMED
APPETITE: INCREASED
PSYCHOMOTOR FUNCTIONING: WNL
ATTENTION: WNL

## 2020-10-01 NOTE — PROGRESS NOTES
Request for Consent  Patient provided verbal consent to treat via telemedicine. Clinician introduced the secure telemedicine platform that we are utilizing to provide care during the COVID-19 pandemic. Patient understands the session will be billed to their insurance or patient directly.  Patient was informed only the patient and the clinician are permitted on?the video conference, sessions are not recorded by the clinician, and the patient is not permitted to record the session.? Patient was provided clinician's unique meeting ID prior to session, patient was asked to arrive to virtual session on time just as patient would if we were in the office. Clinician confirmed identification of patient by name and birthdate, provider name, location of patient and clinician, and callback number in case disconnected. Patient consents to behavioral health treatment    Patient Response to Request for Consent: Yes  Visit Type performed: Audio and Video

## 2020-10-01 NOTE — PROGRESS NOTES
Outpatient Psychology Progress Note    Duration: 50 minutes  Africa Cooper : 1955, a 64 y.o. female, for follow up visit.  Reason:  She has been experiencing adjusment issues.    HPI:   Chief Complaint   Patient presents with   • Psychotherapy   • Anxiety   • Depression       Current Evaluation:     Mental Status Evaluation:  Arousal Level: Alert  Appearance: Well Groomed  Speech: Regular  Psychomotor Functioning: WNL  Eye Contact: WNL  Est. Premorbid Intelligence: Above average  Orientation: Fully oriented  Attention: WNL  Concentration: WNL  Recent Memory: WNL  Remote Memory: WNL  Thought Content: Appropriate  Thought Process: WNL  Insight: Intact  Perceptual Function: Normal  Delusions: None or age appropriate  Sleeping: No Change  Appetite: Increased  Libido: Decreased  Affect: Labile  Mood: Depressed    Comments:      Additional Assessments done this visit:     [place] Suicide Severity Rating Scale:  Not done today       Session Summary:   Pt increasingly depressed as daughter is no longer certain about relocating to [place] when pt moves.  Pt feels guilty and sad over decreased contact c daughter and grandchildren.  Pt regrets move but cannot afford to change her mind as it would mean forfeiting a large deposit. Pt tearful, overeating and had recent stress related episode of diverticulitis.     Provided support.  Encouraged reassessment of current antidepressant.     Interventions  Acceptance & Adjustment  Monitoring of Symptoms    Psychoeducation provided on:  Depression Anxiety     Recommendations:   Individual Therapy and Psychiatric Evaluation  45 minutes2 times monthly    Goals     • Maximize adjustment and acceptance of limitations           Visit Diagnosis:     1. Adjustment disorder with mixed anxiety and depressed mood        Diagnostic Impression:        Psychological condition is generally worsening       Shelly Le, PhD @ 4:02 PM

## 2020-10-16 ENCOUNTER — HOSPITAL ENCOUNTER (OUTPATIENT)
Dept: PSYCHOLOGY | Facility: CLINIC | Age: 65
Discharge: HOME | End: 2020-10-16
Payer: MEDICARE

## 2020-10-16 DIAGNOSIS — F43.23 ADJUSTMENT DISORDER WITH MIXED ANXIETY AND DEPRESSED MOOD: Primary | ICD-10-CM

## 2020-10-16 PROCEDURE — 90834 PSYTX W PT 45 MINUTES: CPT | Mod: 95 | Performed by: PSYCHOLOGIST

## 2020-10-16 ASSESSMENT — COGNITIVE AND FUNCTIONAL STATUS - GENERAL
THOUGHT_PROCESS: WNL
AFFECT: TENSE
REMOTE MEMORY: WNL
APPETITE: NO CHANGE
PSYCHOMOTOR FUNCTIONING: RESTLESS
APPEARANCE: WELL GROOMED
ORIENTATION: FULLY ORIENTED
IMPULSE CONTROL: INTACT
SPEECH: REGULAR
EYE_CONTACT: WNL
RECENT MEMORY: WNL
DELUSIONS: NONE OR AGE APPROPRIATE
MOOD: ANXIOUS;DEPRESSED
EST. PREMORBID INTELLIGENCE: ABOVE AVERAGE
PERCEPTUAL FUNCTION: NORMAL
THOUGHT_CONTENT: APPROPRIATE
SLEEP_WAKE_CYCLE: NO CHANGE
AROUSAL LEVEL: ALERT
ATTENTION: WNL
LIBIDO: NO CHANGE
INSIGHT: INTACT
CONCENTRATION: WNL

## 2020-10-16 NOTE — PROGRESS NOTES
Outpatient Psychology Progress Note    Duration: 45 minutes  Africa Cooper : 1955, a 65 y.o. female, for follow up visit.  Reason:  She has been experiencing adjustment issues.    HPI:   Chief Complaint   Patient presents with   • Psychotherapy   • Anxiety   • Depression       Current Evaluation:     Mental Status Evaluation:  Arousal Level: Alert  Appearance: Well Groomed  Speech: Regular  Psychomotor Functioning: Restless  Eye Contact: WNL  Est. Premorbid Intelligence: Above average  Orientation: Fully oriented  Attention: WNL  Concentration: WNL  Recent Memory: WNL  Remote Memory: WNL  Thought Content: Appropriate  Thought Process: WNL  Insight: Intact  Perceptual Function: Normal  Delusions: None or age appropriate  Sleeping: No Change  Appetite: No Change  Libido: No change  Affect: Tense  Mood: Anxious, Depressed    Comments:      Additional Assessments done this visit:     [place] Suicide Severity Rating Scale:  Not done today       Session Summary:   Pt highly anxious, dealing c significant and unexpected stressors.   will be losing job and this will impact decision about where and whether to move.  Daughter has possible covid and is awaiting testing.      Session focused on stress mgmt, decision making process.  Pt responsive. Scheduled next session for next week instead of 2 weeks, as is our norm.     Pt has tory c psych NP next week for medication options.  Encouraged making spreadsheet to help pt see the pluses/minuses of various options.      Interventions  Acceptance & Adjustment  Anxiety Reduction Techniques  Monitoring of Symptoms    Psychoeducation provided on:  Depression Anxiety     Recommendations:   Individual Therapy  45 minutesweekly    Goals     • Maximize adjustment and acceptance of limitations           Visit Diagnosis:     1. Adjustment disorder with mixed anxiety and depressed mood        Diagnostic Impression:        Psychological condition is generally worsening        Shelly Le, PhD @ 11:41 AM

## 2020-10-22 ENCOUNTER — HOSPITAL ENCOUNTER (OUTPATIENT)
Dept: PSYCHOLOGY | Facility: CLINIC | Age: 65
End: 2020-10-22
Payer: MEDICARE

## 2020-11-12 ENCOUNTER — HOSPITAL ENCOUNTER (OUTPATIENT)
Dept: PSYCHOLOGY | Facility: CLINIC | Age: 65
Discharge: HOME | End: 2020-11-12
Payer: MEDICARE

## 2020-11-12 DIAGNOSIS — F43.23 ADJUSTMENT DISORDER WITH MIXED ANXIETY AND DEPRESSED MOOD: Primary | ICD-10-CM

## 2020-11-12 PROCEDURE — 90834 PSYTX W PT 45 MINUTES: CPT | Mod: 95 | Performed by: PSYCHOLOGIST

## 2020-11-12 ASSESSMENT — COGNITIVE AND FUNCTIONAL STATUS - GENERAL
AROUSAL LEVEL: ALERT
LIBIDO: NO CHANGE
INSIGHT: INTACT
THOUGHT_PROCESS: WNL
ATTENTION: WNL
SPEECH: REGULAR
ORIENTATION: FULLY ORIENTED
CONCENTRATION: WNL
EYE_CONTACT: WNL
APPETITE: NO CHANGE
EST. PREMORBID INTELLIGENCE: ABOVE AVERAGE
PERCEPTUAL FUNCTION: NORMAL
REMOTE MEMORY: WNL
APPEARANCE: WELL GROOMED
MOOD: EUTHYMIC (NORMAL);HOPEFUL
PSYCHOMOTOR FUNCTIONING: WNL
RECENT MEMORY: WNL
DELUSIONS: NONE OR AGE APPROPRIATE
SLEEP_WAKE_CYCLE: NO CHANGE
THOUGHT_CONTENT: APPROPRIATE

## 2020-11-12 NOTE — PROGRESS NOTES
Outpatient Psychology Progress Note    Duration: 45 minutes  Africa Cooper : 1955, a 65 y.o. female, for follow up visit.  Reason:  She has been experiencing adjustment issues.    HPI:   Chief Complaint   Patient presents with   • Psychotherapy   • Anxiety   • Depression       Current Evaluation:     Mental Status Evaluation:  Arousal Level: Alert  Appearance: Well Groomed  Speech: Regular  Psychomotor Functioning: WNL  Eye Contact: WNL  Est. Premorbid Intelligence: Above average  Orientation: Fully oriented  Attention: WNL  Concentration: WNL  Recent Memory: WNL  Remote Memory: WNL  Thought Content: Appropriate  Thought Process: WNL  Insight: Intact  Perceptual Function: Normal  Delusions: None or age appropriate  Sleeping: No Change  Appetite: No Change  Libido: No change  Mood: Euthymic (normal), Hopeful    Comments:      Additional Assessments done this visit:     [place] Suicide Severity Rating Scale:  Not done today       Session Summary:   Pt c much improved adjustment.  Future living arrangements have been decided in an unexpected but + way and pt and  now adjusting to his unplanned intermediate.  Pt will be starting a new diet and has initiated Vibrid,recommended by psychiatric NP.      Interventions  Acceptance & Adjustment  Monitoring of Symptoms    Psychoeducation provided on:  Other: n/a     Recommendations:   Individual Therapy  45 minutes1 times monthly    Goals     • Maximize adjustment and acceptance of limitations           Visit Diagnosis:     1. Adjustment disorder with mixed anxiety and depressed mood        Diagnostic Impression:        Psychological condition is generally improving       Shelly Le, PhD @ 4:05 PM

## 2020-12-03 ENCOUNTER — HOSPITAL ENCOUNTER (OUTPATIENT)
Dept: PSYCHOLOGY | Facility: CLINIC | Age: 65
Discharge: HOME | End: 2020-12-03
Payer: MEDICARE

## 2020-12-03 DIAGNOSIS — F43.23 ADJUSTMENT DISORDER WITH MIXED ANXIETY AND DEPRESSED MOOD: Primary | ICD-10-CM

## 2020-12-03 PROCEDURE — 90834 PSYTX W PT 45 MINUTES: CPT | Mod: 95 | Performed by: PSYCHOLOGIST

## 2020-12-03 ASSESSMENT — COGNITIVE AND FUNCTIONAL STATUS - GENERAL
EST. PREMORBID INTELLIGENCE: ABOVE AVERAGE
IMPULSE CONTROL: INTACT
ATTENTION: WNL
LIBIDO: NO CHANGE
INSIGHT: INTACT
CONCENTRATION: WNL
SPEECH: REGULAR
MOOD: EUTHYMIC (NORMAL);HOPEFUL
ORIENTATION: FULLY ORIENTED
APPEARANCE: WELL GROOMED
THOUGHT_CONTENT: APPROPRIATE
PSYCHOMOTOR FUNCTIONING: WNL
SLEEP_WAKE_CYCLE: NO CHANGE
DELUSIONS: NONE OR AGE APPROPRIATE
RECENT MEMORY: WNL
AFFECT: FULL RANGE
THOUGHT_PROCESS: WNL
APPETITE: NO CHANGE
AROUSAL LEVEL: ALERT
REMOTE MEMORY: WNL
EYE_CONTACT: WNL
PERCEPTUAL FUNCTION: NORMAL

## 2020-12-03 NOTE — PROGRESS NOTES
Outpatient Psychology Progress Note    Duration: 45 minutes  Africa Cooper : 1955, a 65 y.o. female, for follow up visit.  Reason:  She has been experiencing adjustment issues.    HPI:   Chief Complaint   Patient presents with   • Psychotherapy   • Anxiety   • Depression       Current Evaluation:     Mental Status Evaluation:  Arousal Level: Alert  Appearance: Well Groomed  Speech: Regular  Psychomotor Functioning: WNL  Eye Contact: WNL  Est. Premorbid Intelligence: Above average  Orientation: Fully oriented  Attention: WNL  Concentration: WNL  Recent Memory: WNL  Remote Memory: WNL  Thought Content: Appropriate  Thought Process: WNL  Insight: Intact  Perceptual Function: Normal  Delusions: None or age appropriate  Sleeping: No Change  Appetite: No Change  Libido: No change  Affect: Full Range  Mood: Euthymic (normal), Hopeful    Comments:      Additional Assessments done this visit:     [place] Suicide Severity Rating Scale:  Not done today       Session Summary:   Pt presents c bright affect and mood, notes improved energy level.  Feels she is responding well to Viibryd, her new antidepressant.  Pt successful so far on new diet and is 15 lbs from her goal.    Pt shows improved self confidence and comfort re the future.    Because of progress, we agree on f/u in 5 wks.      Interventions  Acceptance & Adjustment  Goal Setting  Monitoring of Symptoms    Psychoeducation provided on:  Other: n/a     Recommendations:   Individual Therapy  45 minutesmonthly    Goals     • Maximize adjustment and acceptance of limitations           Visit Diagnosis:     1. Adjustment disorder with mixed anxiety and depressed mood        Diagnostic Impression:        Psychological condition is generally improving       Shelly Le, PhD @ 3:53 PM

## 2021-01-07 ENCOUNTER — HOSPITAL ENCOUNTER (OUTPATIENT)
Dept: PSYCHOLOGY | Facility: CLINIC | Age: 66
Discharge: HOME | End: 2021-01-07
Payer: MEDICARE

## 2021-01-07 DIAGNOSIS — F43.23 ADJUSTMENT DISORDER WITH MIXED ANXIETY AND DEPRESSED MOOD: Primary | ICD-10-CM

## 2021-01-07 PROCEDURE — 90834 PSYTX W PT 45 MINUTES: CPT | Mod: 95 | Performed by: PSYCHOLOGIST

## 2021-01-07 ASSESSMENT — COGNITIVE AND FUNCTIONAL STATUS - GENERAL
APPEARANCE: WELL GROOMED
LIBIDO: NO CHANGE
EST. PREMORBID INTELLIGENCE: ABOVE AVERAGE
THOUGHT_CONTENT: APPROPRIATE
SLEEP_WAKE_CYCLE: NO CHANGE
DELUSIONS: NONE OR AGE APPROPRIATE
RECENT MEMORY: WNL
EYE_CONTACT: WNL
AFFECT: FULL RANGE
ATTENTION: WNL
PSYCHOMOTOR FUNCTIONING: WNL
MOOD: EUTHYMIC (NORMAL);HOPEFUL
IMPULSE CONTROL: INTACT
APPETITE: NO CHANGE
ORIENTATION: FULLY ORIENTED
SPEECH: REGULAR
AROUSAL LEVEL: ALERT
INSIGHT: INTACT
CONCENTRATION: WNL
THOUGHT_PROCESS: WNL
PERCEPTUAL FUNCTION: NORMAL

## 2021-01-07 NOTE — PROGRESS NOTES
Outpatient Psychology Progress Note    Duration: 45 minutes  Africa Cooper : 1955, a 65 y.o. female, for follow up visit.  Reason:  She has been experiencing adjustment issues.    HPI:   Chief Complaint   Patient presents with   • Psychotherapy   • Anxiety   • Depression       Current Evaluation:     Mental Status Evaluation:  Arousal Level: Alert  Appearance: Well Groomed  Speech: Regular  Psychomotor Functioning: WNL  Eye Contact: WNL  Est. Premorbid Intelligence: Above average  Orientation: Fully oriented  Attention: WNL  Concentration: WNL  Recent Memory: WNL  Thought Content: Appropriate  Thought Process: WNL  Insight: Intact  Perceptual Function: Normal  Delusions: None or age appropriate  Sleeping: No Change  Appetite: No Change  Libido: No change  Affect: Full Range  Mood: Euthymic (normal), Hopeful    Comments:      Additional Assessments done this visit:     [place] Suicide Severity Rating Scale:  Not done today       Session Summary:   See above     Interventions  Acceptance & Adjustment  Monitoring of Symptoms    Psychoeducation provided on:  Other: exercise compliance     Recommendations:   Individual Therapy  45 minutes1-2 times monthly    Goals     • Maximize adjustment and acceptance of limitations           Visit Diagnosis:     1. Adjustment disorder with mixed anxiety and depressed mood        Diagnostic Impression:        Psychological condition is generally improving       Shelly Le, PhD @ 4:05 PMOutpatient Psychology Progress Note    Duration: 45 minutes  Africa Cooper : 1955, a 65 y.o. female, for follow up visit.  Reason:  She has been experiencing adjustment issues.    HPI:   Chief Complaint   Patient presents with   • Psychotherapy   • Anxiety   • Depression       Current Evaluation:     Mental Status Evaluation:  Arousal Level: Alert  Appearance: Well Groomed  Speech: Regular  Psychomotor Functioning: WNL  Eye Contact: WNL  Est. Premorbid Intelligence: Above  average  Orientation: Fully oriented  Attention: WNL  Concentration: WNL  Recent Memory: WNL  Thought Content: Appropriate  Thought Process: WNL  Insight: Intact  Perceptual Function: Normal  Delusions: None or age appropriate  Sleeping: No Change  Appetite: No Change  Libido: No change  Affect: Full Range  Mood: Euthymic (normal), Hopeful    Comments:      Additional Assessments done this visit:     [place] Suicide Severity Rating Scale:  Not done today       Session Summary:   Pt c some frustration over process of designing her new home but otherwise doing well.  Has been able to lose an additional 15 lbs.  While compliance c diet and weight counseling has been excellent, pt less consistent c exercise.  Discussed obstacles and strategies to improve.     Pt transitioning from active role in [employer] and unsure how to fill time. This and 's penitentiary are significant changes for her.  Will continue to address.    Interventions  Acceptance & Adjustment  Monitoring of Symptoms    Psychoeducation provided on:  Other: exercise compliance     Recommendations:   Individual Therapy  45 minutes1-2 times monthly    Goals     • Maximize adjustment and acceptance of limitations           Visit Diagnosis:     1. Adjustment disorder with mixed anxiety and depressed mood        Diagnostic Impression:        Psychological condition is generally improving       Shelly Le, PhD @ 4:05 PM

## 2021-01-28 ENCOUNTER — HOSPITAL ENCOUNTER (OUTPATIENT)
Dept: PSYCHOLOGY | Facility: CLINIC | Age: 66
Discharge: HOME | End: 2021-01-28
Payer: MEDICARE

## 2021-01-28 DIAGNOSIS — F43.23 ADJUSTMENT DISORDER WITH MIXED ANXIETY AND DEPRESSED MOOD: Primary | ICD-10-CM

## 2021-01-28 PROCEDURE — 90832 PSYTX W PT 30 MINUTES: CPT | Mod: 95 | Performed by: PSYCHOLOGIST

## 2021-01-28 ASSESSMENT — COGNITIVE AND FUNCTIONAL STATUS - GENERAL
PERCEPTUAL FUNCTION: NORMAL
IMPULSE CONTROL: INTACT
EYE_CONTACT: WNL
MOOD: EUTHYMIC (NORMAL);HOPEFUL
APPEARANCE: WELL GROOMED
INSIGHT: INTACT
SLEEP_WAKE_CYCLE: NO CHANGE
DELUSIONS: NONE OR AGE APPROPRIATE
CONCENTRATION: WNL
AFFECT: FULL RANGE
LIBIDO: NO CHANGE
EST. PREMORBID INTELLIGENCE: ABOVE AVERAGE
PSYCHOMOTOR FUNCTIONING: WNL
ORIENTATION: FULLY ORIENTED
THOUGHT_PROCESS: WNL
RECENT MEMORY: WNL
APPETITE: NO CHANGE
THOUGHT_CONTENT: APPROPRIATE
SPEECH: REGULAR
AROUSAL LEVEL: ALERT
REMOTE MEMORY: WNL
ATTENTION: WNL

## 2021-01-28 NOTE — PROGRESS NOTES
Outpatient Psychology Progress Note    Duration: 30 minutes  Africa Cooper : 1955, a 65 y.o. female, for follow up visit.  Reason:  She has been experiencing adjustment issues=.    HPI:   Chief Complaint   Patient presents with   • Psychotherapy   • Multiple Sclerosis       Current Evaluation:     Mental Status Evaluation:  Arousal Level: Alert  Appearance: Well Groomed  Speech: Regular  Psychomotor Functioning: WNL  Eye Contact: WNL  Est. Premorbid Intelligence: Above average  Orientation: Fully oriented  Attention: WNL  Concentration: WNL  Recent Memory: WNL  Remote Memory: WNL  Thought Content: Appropriate  Thought Process: WNL  Insight: Intact  Perceptual Function: Normal  Delusions: None or age appropriate  Sleeping: No Change  Appetite: No Change  Libido: No change  Affect: Full Range  Mood: Euthymic (normal), Hopeful    Comments:      Additional Assessments done this visit:     [place] Suicide Severity Rating Scale:  Not done today       Session Summary:   Pt in good spirits, doing well. Anxiety about 's health resolved c negative Covid test.  Pt continues to do well on current diet c ongoing weight loss.      Interventions  Acceptance & Adjustment    Psychoeducation provided on:  Other: n/a     Recommendations:   Individual Therapy  45 minutesmonthly    Goals     • Maximize adjustment and acceptance of limitations           Visit Diagnosis:     1. Adjustment disorder with mixed anxiety and depressed mood        Diagnostic Impression:        Psychological condition is generally improving       Shelly Le, PhD @ 3:37 PM

## 2021-03-04 ENCOUNTER — HOSPITAL ENCOUNTER (OUTPATIENT)
Dept: PSYCHOLOGY | Facility: CLINIC | Age: 66
Discharge: HOME | End: 2021-03-04
Payer: MEDICARE

## 2021-03-04 DIAGNOSIS — F43.23 ADJUSTMENT DISORDER WITH MIXED ANXIETY AND DEPRESSED MOOD: Primary | ICD-10-CM

## 2021-03-04 PROCEDURE — 90834 PSYTX W PT 45 MINUTES: CPT | Mod: 95 | Performed by: PSYCHOLOGIST

## 2021-03-04 ASSESSMENT — COGNITIVE AND FUNCTIONAL STATUS - GENERAL
INSIGHT: INTACT
RECENT MEMORY: WNL
APPEARANCE: WELL GROOMED
LIBIDO: NO CHANGE
THOUGHT_CONTENT: APPROPRIATE
ATTENTION: WNL
DELUSIONS: NONE OR AGE APPROPRIATE
ORIENTATION: FULLY ORIENTED
EST. PREMORBID INTELLIGENCE: ABOVE AVERAGE
AFFECT: FULL RANGE
PSYCHOMOTOR FUNCTIONING: WNL
CONCENTRATION: WNL
THOUGHT_PROCESS: WNL
AROUSAL LEVEL: ALERT
SPEECH: REGULAR
REMOTE MEMORY: WNL
PERCEPTUAL FUNCTION: NORMAL
APPETITE: NO CHANGE
EYE_CONTACT: WNL
SLEEP_WAKE_CYCLE: NO CHANGE
IMPULSE CONTROL: INTACT

## 2021-03-04 NOTE — PROGRESS NOTES
Outpatient Psychology Progress Note    Duration: 45 minutes  Africa Cooper : 1955, a 65 y.o. female, for follow up visit.  Reason:  She has been experiencing adjustment issues.    HPI:   Chief Complaint   Patient presents with   • Psychotherapy   • Anxiety   • Depression       Current Evaluation:     Mental Status Evaluation:  Arousal Level: Alert  Appearance: Well Groomed  Speech: Regular  Psychomotor Functioning: WNL  Eye Contact: WNL  Est. Premorbid Intelligence: Above average  Orientation: Fully oriented  Attention: WNL  Concentration: WNL  Recent Memory: WNL  Remote Memory: WNL  Thought Content: Appropriate  Thought Process: WNL  Insight: Intact  Perceptual Function: Normal  Delusions: None or age appropriate  Sleeping: No Change  Appetite: No Change  Libido: No change  Affect: Full Range  Mood: Frustrated, Euthymic (normal), Motivated    Comments:      Additional Assessments done this visit:     [place] Suicide Severity Rating Scale:  Not done today       Session Summary:   Pt now within 10 lbs of weight loss goal.  Very pleased but still having difficulty exercising consistently.    Some frustration over decisions made for new home.  Reframed for pt; pt less distressed p discussion.    Explored family dynamics/different ways of grieving as pt and family start to go through belongings of  father and consequently  re-animate issues from their earlier life.      Interventions  Acceptance & Adjustment  Communication Skills development  Monitoring of Symptoms    Psychoeducation provided on:  Other: grief     Recommendations:   Individual Therapy  45 minutes1-2 times monthly    Goals     • Maximize adjustment and acceptance of limitations           Visit Diagnosis:     1. Adjustment disorder with mixed anxiety and depressed mood        Diagnostic Impression:        Psychological condition is generally improving       Shelly Le, PhD @ 4:14 PM

## 2021-03-18 ENCOUNTER — HOSPITAL ENCOUNTER (OUTPATIENT)
Dept: PSYCHOLOGY | Facility: CLINIC | Age: 66
Discharge: HOME | End: 2021-03-18
Payer: MEDICARE

## 2021-03-18 DIAGNOSIS — F43.23 ADJUSTMENT DISORDER WITH MIXED ANXIETY AND DEPRESSED MOOD: Primary | ICD-10-CM

## 2021-03-18 PROCEDURE — 90834 PSYTX W PT 45 MINUTES: CPT | Mod: 95 | Performed by: PSYCHOLOGIST

## 2021-03-18 ASSESSMENT — COGNITIVE AND FUNCTIONAL STATUS - GENERAL
DELUSIONS: NONE OR AGE APPROPRIATE
AFFECT: FULL RANGE
ATTENTION: WNL
AROUSAL LEVEL: ALERT
THOUGHT_CONTENT: APPROPRIATE
REMOTE MEMORY: WNL
RECENT MEMORY: WNL
SPEECH: REGULAR
IMPULSE CONTROL: INTACT
THOUGHT_PROCESS: WNL
LIBIDO: NO CHANGE
APPEARANCE: WELL GROOMED
PSYCHOMOTOR FUNCTIONING: WNL
EST. PREMORBID INTELLIGENCE: ABOVE AVERAGE
ORIENTATION: FULLY ORIENTED
CONCENTRATION: WNL
INSIGHT: INTACT
EYE_CONTACT: WNL
SLEEP_WAKE_CYCLE: NO CHANGE
PERCEPTUAL FUNCTION: NORMAL
APPETITE: NO CHANGE

## 2021-03-18 NOTE — PROGRESS NOTES
Outpatient Psychology Progress Note    Duration: 45 minutes  Africa Cooper : 1955, a 65 y.o. female, for follow up visit.  Reason:  She has been experiencing adjustment issues.    HPI:   Chief Complaint   Patient presents with   • Psychotherapy   • Anxiety   • Depression       Current Evaluation:     Mental Status Evaluation:  Arousal Level: Alert  Appearance: Well Groomed  Speech: Regular  Psychomotor Functioning: WNL  Eye Contact: WNL  Est. Premorbid Intelligence: Above average  Orientation: Fully oriented  Attention: WNL  Concentration: WNL  Recent Memory: WNL  Remote Memory: WNL  Thought Content: Appropriate  Thought Process: WNL  Insight: Intact  Perceptual Function: Normal  Delusions: None or age appropriate  Sleeping: No Change  Appetite: No Change  Libido: No change  Affect: Full Range  Mood: Frustrated, Euthymic (normal), Motivated, Anxious    Comments:      Additional Assessments done this visit:     [place] Suicide Severity Rating Scale:  Not done today       Session Summary:   Pt making good progress in weight loss but frustrated by her inability to develop and follow a consistent exercise schedule. Discussed obstacles. Pt has excellent insight into what prevents her from exercising but has not yet gotten past these challenges.  Pt wants to try to make a spreadsheet, a strategy that has been helpful in other endeavors. Pt growing anxious as she approaches maintenance phase; has not had prior success.    Otherwise doing well, c + interpersonal relationships.  Pleased she and most family members have had at least first vaccine dose.      Interventions  Acceptance & Adjustment  Monitoring of Symptoms    Psychoeducation provided on:  Other: n/a     Recommendations:   Individual Therapy  45 minutes1-2 times monthly    Goals     • Maximize adjustment and acceptance of limitations           Visit Diagnosis:     1. Adjustment disorder with mixed anxiety and depressed mood        Diagnostic Impression:         Psychological condition is generally improving       Shelly Le, PhD @ 3:54 PM

## 2021-04-08 ENCOUNTER — HOSPITAL ENCOUNTER (OUTPATIENT)
Dept: PSYCHOLOGY | Facility: CLINIC | Age: 66
Discharge: HOME | End: 2021-04-08
Payer: MEDICARE

## 2021-04-08 DIAGNOSIS — F43.23 ADJUSTMENT DISORDER WITH MIXED ANXIETY AND DEPRESSED MOOD: Primary | ICD-10-CM

## 2021-04-08 PROCEDURE — 90834 PSYTX W PT 45 MINUTES: CPT | Mod: 95 | Performed by: PSYCHOLOGIST

## 2021-04-08 ASSESSMENT — COGNITIVE AND FUNCTIONAL STATUS - GENERAL
SPEECH: REGULAR
EYE_CONTACT: WNL
MOOD: EUTHYMIC (NORMAL);FRUSTRATED
THOUGHT_PROCESS: WNL
PERCEPTUAL FUNCTION: NORMAL
IMPULSE CONTROL: INTACT
AFFECT: FULL RANGE
APPEARANCE: WELL GROOMED
INSIGHT: INTACT
APPETITE: NO CHANGE
ORIENTATION: FULLY ORIENTED
DELUSIONS: NONE OR AGE APPROPRIATE
PSYCHOMOTOR FUNCTIONING: WNL
THOUGHT_CONTENT: APPROPRIATE
RECENT MEMORY: WNL
REMOTE MEMORY: WNL
CONCENTRATION: WNL
EST. PREMORBID INTELLIGENCE: ABOVE AVERAGE
ATTENTION: WNL
LIBIDO: NO CHANGE
SLEEP_WAKE_CYCLE: NO CHANGE
AROUSAL LEVEL: ALERT

## 2021-04-08 NOTE — PROGRESS NOTES
Request for Consent  Patient provided verbal consent to treat via telemedicine. Clinician introduced the secure telemedicine platform that we are utilizing to provide care during the COVID-19 pandemic. Patient understands the session will be billed to their insurance or patient directly.  Patient was informed only the patient and the clinician are permitted on?the video conference, sessions are not recorded by the clinician, and the patient is not permitted to record the session.? Patient was provided clinician's unique meeting ID prior to session, patient was asked to arrive to virtual session on time just as patient would if we were in the office. Clinician confirmed identification of patient by name and birthdate, provider name, location of patient and clinician, and callback number in case disconnected. Patient consents to behavioral health treatment    Patient Response to Request for Consent: Yes  Visit Type performed: Audio Only

## 2021-04-08 NOTE — PROGRESS NOTES
Outpatient Psychology Progress Note    Duration: 45 minutes  Africa Cooper : 1955, a 65 y.o. female, for follow up visit.  Reason:  She has been experiencing adjustment issues    HPI:   Chief Complaint   Patient presents with   • Psychotherapy   • Anxiety   • Depression       Current Evaluation:     Mental Status Evaluation:  Arousal Level: Alert  Appearance: Well Groomed  Speech: Regular  Psychomotor Functioning: WNL  Eye Contact: WNL  Est. Premorbid Intelligence: Above average  Orientation: Fully oriented  Attention: WNL  Concentration: WNL  Recent Memory: WNL  Remote Memory: WNL  Thought Content: Appropriate  Thought Process: WNL  Insight: Intact  Perceptual Function: Normal  Delusions: None or age appropriate  Sleeping: No Change  Appetite: No Change  Libido: No change  Affect: Full Range  Mood: Euthymic (normal), Frustrated    Comments:      Additional Assessments done this visit:     [place] Suicide Severity Rating Scale:  Not done today       Session Summary:   Pt in good spirits, mild over reaction to a minor event. Recognizes this.     Continues on weight loss program c ongoing + results despite some frustration c coaching aspect. Not feeling ready for next phase, which adds foods back.      Interventions  Acceptance & Adjustment  Goal Setting  Monitoring of Symptoms    Psychoeducation provided on:  Other: n/a     Recommendations:   Individual Therapy  45 minutes1-2 times monthly    Goals     • Maximize adjustment and acceptance of limitations           Visit Diagnosis:     1. Adjustment disorder with mixed anxiety and depressed mood        Diagnostic Impression:        Psychological condition is generally improving       Shelly Le, PhD @ 3:21 PM

## 2021-05-12 ENCOUNTER — HOSPITAL ENCOUNTER (OUTPATIENT)
Dept: PSYCHOLOGY | Facility: CLINIC | Age: 66
Discharge: HOME | End: 2021-05-12
Payer: MEDICARE

## 2021-05-12 DIAGNOSIS — F43.23 ADJUSTMENT DISORDER WITH MIXED ANXIETY AND DEPRESSED MOOD: Primary | ICD-10-CM

## 2021-05-12 PROCEDURE — 90834 PSYTX W PT 45 MINUTES: CPT | Mod: 95 | Performed by: PSYCHOLOGIST

## 2021-05-12 ASSESSMENT — COGNITIVE AND FUNCTIONAL STATUS - GENERAL
PERCEPTUAL FUNCTION: NORMAL
IMPULSE CONTROL: INTACT
CONCENTRATION: WNL
THOUGHT_PROCESS: WNL
DELUSIONS: NONE OR AGE APPROPRIATE
SLEEP_WAKE_CYCLE: NO CHANGE
ATTENTION: WNL
EYE_CONTACT: WNL
APPETITE: NO CHANGE
PSYCHOMOTOR FUNCTIONING: WNL
RECENT MEMORY: WNL
REMOTE MEMORY: WNL
THOUGHT_CONTENT: APPROPRIATE
MOOD: EUTHYMIC (NORMAL);HOPEFUL;MOTIVATED
AFFECT: FULL RANGE
ORIENTATION: FULLY ORIENTED
INSIGHT: INTACT
AROUSAL LEVEL: ALERT
APPEARANCE: WELL GROOMED
SPEECH: REGULAR
EST. PREMORBID INTELLIGENCE: ABOVE AVERAGE

## 2021-05-12 NOTE — PROGRESS NOTES
Outpatient Psychology Progress Note    Duration: 50 minutes  Africa Cooper : 1955, a 65 y.o. female, for follow up visit.  Reason:  She has been experiencing adjustment issues.    HPI:   Chief Complaint   Patient presents with   • Psychotherapy   • Anxiety   • Depression   • Multiple Sclerosis       Current Evaluation:     Mental Status Evaluation:  Arousal Level: Alert  Appearance: Well Groomed  Speech: Regular  Psychomotor Functioning: WNL  Eye Contact: WNL  Est. Premorbid Intelligence: Above average  Orientation: Fully oriented  Attention: WNL  Concentration: WNL  Recent Memory: WNL  Remote Memory: WNL  Thought Content: Appropriate  Thought Process: WNL  Insight: Intact  Perceptual Function: Normal  Delusions: None or age appropriate  Sleeping: No Change  Appetite: No Change  Affect: Full Range  Mood: Euthymic (normal), Hopeful, Motivated    Comments:      Additional Assessments done this visit:     [place] Suicide Severity Rating Scale:  Not done today       Session Summary:   Pt c bright affect and mood.  She is approaching her weight loss goal and very pleased c her perseverance and success.  Pt more committed to exercise and trying to develop a routine she can continue c when she is in the maintenance phase.      Pt is also looking forward to moving, perhaps in 2 mos and is very excited about her new home.    Pt is starting to transition from being disabled to retired and this is both a financial and emotional adjustment.  Will continue to discuss.      Interventions  Acceptance & Adjustment  Goal Setting  Monitoring of Symptoms    Psychoeducation provided on:  Other: n/a     Recommendations:   Individual Therapy  45 minutes1 times monthly    Goals     • Maximize adjustment and acceptance of limitations           Visit Diagnosis:     1. Adjustment disorder with mixed anxiety and depressed mood        Diagnostic Impression:        Psychological condition is generally improving       Shelly Le,  PhD @ 3:45 PM

## 2021-06-03 ENCOUNTER — HOSPITAL ENCOUNTER (OUTPATIENT)
Dept: PSYCHOLOGY | Facility: CLINIC | Age: 66
Discharge: HOME | End: 2021-06-03
Payer: MEDICARE

## 2021-06-03 DIAGNOSIS — F43.23 ADJUSTMENT DISORDER WITH MIXED ANXIETY AND DEPRESSED MOOD: Primary | ICD-10-CM

## 2021-06-03 PROCEDURE — 90834 PSYTX W PT 45 MINUTES: CPT | Performed by: PSYCHOLOGIST

## 2021-06-03 ASSESSMENT — COGNITIVE AND FUNCTIONAL STATUS - GENERAL
EST. PREMORBID INTELLIGENCE: ABOVE AVERAGE
RECENT MEMORY: WNL
THOUGHT_PROCESS: WNL
THOUGHT_CONTENT: APPROPRIATE
PSYCHOMOTOR FUNCTIONING: WNL
PERCEPTUAL FUNCTION: PAIN
INSIGHT: INTACT
ATTENTION: WNL
REMOTE MEMORY: WNL
AFFECT: FULL RANGE
SPEECH: REGULAR
CONCENTRATION: WNL
ORIENTATION: FULLY ORIENTED
LIBIDO: NO CHANGE
APPEARANCE: WELL GROOMED
MOOD: EUTHYMIC (NORMAL);HOPEFUL;MOTIVATED;ANXIOUS
AROUSAL LEVEL: ALERT
IMPULSE CONTROL: INTACT
SLEEP_WAKE_CYCLE: NO CHANGE
DELUSIONS: NONE OR AGE APPROPRIATE
APPETITE: NO CHANGE
EYE_CONTACT: WNL

## 2021-06-03 NOTE — PROGRESS NOTES
Outpatient Psychology Progress Note    Duration: 50 minutes  Africa Cooper : 1955, a 65 y.o. female, for follow up visit.  Reason:  She has been experiencing adjustment issues.    HPI:   Chief Complaint   Patient presents with   • Psychotherapy   • Anxiety   • Depression   • Multiple Sclerosis       Current Evaluation:     Mental Status Evaluation:  Arousal Level: Alert  Appearance: Well Groomed  Speech: Regular  Psychomotor Functioning: WNL  Eye Contact: WNL  Est. Premorbid Intelligence: Above average  Orientation: Fully oriented  Attention: WNL  Concentration: WNL  Recent Memory: WNL  Remote Memory: WNL  Thought Content: Appropriate  Thought Process: WNL  Insight: Intact  Perceptual Function: Pain  Delusions: None or age appropriate  Sleeping: No Change  Appetite: No Change  Libido: No change  Affect: Full Range  Mood: Euthymic (normal), Hopeful, Motivated, Anxious    Comments:      Additional Assessments done this visit:     [place] Suicide Severity Rating Scale:  Not done today       Safe-T Assessment:  Not done today       Session Summary:   Pt seen in person, very excited c relaxation of COVID restrictions.  Pt more social and enjoying her activities.  Pt continues to be diligent in her weight loss program and is 2 lbs from her goal. Pt better able to handle her rare lapses from her diet.    Pt does present c anxiety re several issues.  She is having more MS related spasms and LE difficulties.  We discussed PT here and pt will seek a scrip, as well as consider medication options. 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      Interventions  Acceptance  Monitoring      Psychoeducation provided on:  Other: PT     Recommendations:      Individual Therapy  45 minutesmonthly    Goals     • Maximize adjustment and acceptance of limitations           Visit  Diagnosis:     1. Adjustment disorder with mixed anxiety and depressed mood        Diagnostic Impression:        Psychological condition is generally improving       Shelly Le, PhD @ 4:09 PM

## 2021-06-23 ENCOUNTER — APPOINTMENT (OUTPATIENT)
Dept: URBAN - METROPOLITAN AREA CLINIC 200 | Age: 66
Setting detail: DERMATOLOGY
End: 2021-06-28

## 2021-06-23 DIAGNOSIS — Z11.52 ENCOUNTER FOR SCREENING FOR COVID-19: ICD-10-CM

## 2021-06-23 DIAGNOSIS — L57.8 OTHER SKIN CHANGES DUE TO CHRONIC EXPOSURE TO NONIONIZING RADIATION: ICD-10-CM

## 2021-06-23 DIAGNOSIS — L90.0 LICHEN SCLEROSUS ET ATROPHICUS: ICD-10-CM

## 2021-06-23 DIAGNOSIS — L65.9 NONSCARRING HAIR LOSS, UNSPECIFIED: ICD-10-CM

## 2021-06-23 DIAGNOSIS — T50.905 ADVERSE EFFECT OF UNSPECIFIED DRUGS, MEDICAMENTS AND BIOLOGICAL SUBSTANCES: ICD-10-CM

## 2021-06-23 PROBLEM — J30.1 ALLERGIC RHINITIS DUE TO POLLEN: Status: ACTIVE | Noted: 2021-06-23

## 2021-06-23 PROBLEM — T50.905A ADVERSE EFFECT OF UNSPECIFIED DRUGS, MEDICAMENTS AND BIOLOGICAL SUBSTANCES, INITIAL ENCOUNTER: Status: ACTIVE | Noted: 2021-06-23

## 2021-06-23 PROCEDURE — OTHER REASSURANCE: OTHER

## 2021-06-23 PROCEDURE — OTHER COUNSELING: OTHER

## 2021-06-23 PROCEDURE — OTHER PRESCRIPTION MEDICATION MANAGEMENT: OTHER

## 2021-06-23 PROCEDURE — OTHER SCREENING FOR COVID-19: OTHER

## 2021-06-23 PROCEDURE — OTHER PRESCRIPTION: OTHER

## 2021-06-23 PROCEDURE — 99213 OFFICE O/P EST LOW 20 MIN: CPT

## 2021-06-23 RX ORDER — TRIAMCINOLONE ACETONIDE 1 MG/G
CREAM TOPICAL
Qty: 1 | Refills: 5 | Status: ERX | COMMUNITY
Start: 2021-06-23

## 2021-06-23 ASSESSMENT — LOCATION SIMPLE DESCRIPTION DERM
LOCATION SIMPLE: RIGHT THIGH
LOCATION SIMPLE: LEFT BREAST
LOCATION SIMPLE: CHEST
LOCATION SIMPLE: ABDOMEN
LOCATION SIMPLE: RIGHT BREAST
LOCATION SIMPLE: LEFT SCALP
LOCATION SIMPLE: POSTERIOR SCALP

## 2021-06-23 ASSESSMENT — LOCATION DETAILED DESCRIPTION DERM
LOCATION DETAILED: LEFT CENTRAL FRONTAL SCALP
LOCATION DETAILED: POSTERIOR MID-PARIETAL SCALP
LOCATION DETAILED: RIGHT ANTERIOR PROXIMAL THIGH
LOCATION DETAILED: LEFT MEDIAL BREAST 7-8:00 REGION
LOCATION DETAILED: RIGHT MEDIAL BREAST 5-6:00 REGION
LOCATION DETAILED: PERIUMBILICAL SKIN
LOCATION DETAILED: LEFT MEDIAL SUPERIOR CHEST

## 2021-06-23 ASSESSMENT — LOCATION ZONE DERM
LOCATION ZONE: LEG
LOCATION ZONE: SCALP
LOCATION ZONE: TRUNK

## 2021-06-23 ASSESSMENT — SEVERITY ASSESSMENT: SEVERITY: MILD

## 2021-06-23 NOTE — PROCEDURE: SCREENING FOR COVID-19
Has The Patient Been Infected With Covid-19 In The Past?: No
Detail Level: Generalized
Previous Infection Text: The patient has recovered from a previous COVID-19 infection.

## 2021-10-28 ENCOUNTER — HOSPITAL ENCOUNTER (OUTPATIENT)
Dept: PSYCHOLOGY | Facility: CLINIC | Age: 66
Discharge: HOME | End: 2021-10-28
Payer: MEDICARE

## 2021-10-28 DIAGNOSIS — F43.23 ADJUSTMENT DISORDER WITH MIXED ANXIETY AND DEPRESSED MOOD: Primary | ICD-10-CM

## 2021-10-28 PROCEDURE — 90837 PSYTX W PT 60 MINUTES: CPT | Performed by: PSYCHOLOGIST

## 2021-10-28 ASSESSMENT — COGNITIVE AND FUNCTIONAL STATUS - GENERAL
EYE_CONTACT: WNL
THOUGHT_CONTENT: APPROPRIATE
PSYCHOMOTOR FUNCTIONING: WNL
INSIGHT: INTACT
LIBIDO: NO CHANGE
THOUGHT_PROCESS: WNL
MOOD: ANGRY;ANXIOUS
IMPULSE CONTROL: INTACT
SPEECH: REGULAR
DELUSIONS: NONE OR AGE APPROPRIATE
PERCEPTUAL FUNCTION: NORMAL
SLEEP_WAKE_CYCLE: NO CHANGE
APPEARANCE: WELL GROOMED
ATTENTION: WNL
AROUSAL LEVEL: ATTENTIVE
REMOTE MEMORY: WNL
APPETITE: NO CHANGE
ORIENTATION: FULLY ORIENTED
EST. PREMORBID INTELLIGENCE: ABOVE AVERAGE
AFFECT: FULL RANGE
CONCENTRATION: WNL
RECENT MEMORY: WNL

## 2021-10-28 NOTE — PROGRESS NOTES
Outpatient Psychology Progress Note    Duration: 55 minutes  Africa Cooper : 1955, a 66 y.o. female, for follow up visit.  Reason:  She has been experiencing adjustment issues.    HPI:   Chief Complaint   Patient presents with   • Psychotherapy   • Anxiety   • Depression       Current Evaluation:     Mental Status Evaluation:  Arousal Level: Attentive  Appearance: Well Groomed  Speech: Regular  Psychomotor Functioning: WNL  Eye Contact: WNL  Est. Premorbid Intelligence: Above average  Orientation: Fully oriented  Attention: WNL  Concentration: WNL  Recent Memory: WNL  Remote Memory: WNL  Thought Content: Appropriate  Thought Process: WNL  Insight: Intact  Perceptual Function: Normal  Delusions: None or age appropriate  Sleeping: No Change  Appetite: No Change  Libido: No change  Affect: Full Range  Mood: Angry,Anxious    Comments:      Additional Assessments done this visit:     [place] Suicide Severity Rating Scale:  Not done today           Safe-T Assessment:  Not done today       Session Summary:   Pt returns to psych tx p long gap.  She is doing well in many areas but dealing c some stressors.  Pt in new community, trying to make new friends and establish a new routine.  She is now retired (vs disabled) and facing new financial challenges and insurance issues.  Pt has increased concerns re family health and dynamics/conflicts.    Support provided.      Goals Addressed                 This Visit's Progress    • Maximize adjustment and acceptance of limitations   On track          Interventions  Acceptance & Adjustment  Monitoring of Symptoms    Psychoeducation provided on:  Other: n/a     Recommendations:      Individual Therapy  1 hourmonthly      Visit Diagnosis:     1. Adjustment disorder with mixed anxiety and depressed mood        Diagnostic Impression:        Psychological condition is generally improving       Shelly Le, PhD @ 4:13 PM

## 2021-11-18 ENCOUNTER — HOSPITAL ENCOUNTER (OUTPATIENT)
Dept: PSYCHOLOGY | Facility: CLINIC | Age: 66
Discharge: HOME | End: 2021-11-18
Payer: MEDICARE

## 2021-11-18 DIAGNOSIS — F43.23 ADJUSTMENT DISORDER WITH MIXED ANXIETY AND DEPRESSED MOOD: Primary | ICD-10-CM

## 2021-11-18 PROCEDURE — 90837 PSYTX W PT 60 MINUTES: CPT | Performed by: PSYCHOLOGIST

## 2021-11-18 ASSESSMENT — COGNITIVE AND FUNCTIONAL STATUS - GENERAL
PSYCHOMOTOR FUNCTIONING: WNL
APPETITE: NO CHANGE
ATTENTION: WNL
DELUSIONS: NONE OR AGE APPROPRIATE
AROUSAL LEVEL: ATTENTIVE
PERCEPTUAL FUNCTION: NORMAL
LIBIDO: NO CHANGE
THOUGHT_PROCESS: WNL
EYE_CONTACT: WNL
INSIGHT: INTACT
AFFECT: FULL RANGE
APPEARANCE: WELL GROOMED
SLEEP_WAKE_CYCLE: NO CHANGE
ORIENTATION: FULLY ORIENTED
MOOD: HOPEFUL;MOTIVATED;ANXIOUS
SPEECH: REGULAR
THOUGHT_CONTENT: APPROPRIATE
CONCENTRATION: WNL
RECENT MEMORY: WNL
IMPULSE CONTROL: INTACT
REMOTE MEMORY: WNL
EST. PREMORBID INTELLIGENCE: ABOVE AVERAGE

## 2021-11-18 NOTE — PROGRESS NOTES
Outpatient Psychology Progress Note    Duration: 55 minutes  Africa Cooper : 1955, a 66 y.o. female, for follow up visit.  Reason:  She has been experiencing adjustment issues.    HPI:   Chief Complaint   Patient presents with   • Psychotherapy   • Multiple Sclerosis       Current Evaluation:     Mental Status Evaluation:  Arousal Level: Attentive  Appearance: Well Groomed  Speech: Regular  Psychomotor Functioning: WNL  Eye Contact: WNL  Est. Premorbid Intelligence: Above average  Orientation: Fully oriented  Attention: WNL  Concentration: WNL  Recent Memory: WNL  Remote Memory: WNL  Thought Content: Appropriate  Thought Process: WNL  Insight: Intact  Perceptual Function: Normal  Delusions: None or age appropriate  Sleeping: No Change  Appetite: No Change  Libido: No change  Affect: Full Range  Mood: Hopeful,Motivated,Anxious    Comments:      Additional Assessments done this visit:     [place] Suicide Severity Rating Scale:  Not indicated            Safe-T Assessment:  Not indicated        Session Summary:   Pt doing very well, is maintaining weight loss and has concrete plans to be more physically active.     Does have concerns re COVID  Vaccination issues and family health.  Pt recognizes the limits of her control and is problem solving well.  Has an appropriate degree of anxiety re issues.     Plan f/u in .      Goals Addressed                 This Visit's Progress    • Maximize adjustment and acceptance of limitations   On track          Interventions  Acceptance & Adjustment  Monitoring of Symptoms    Psychoeducation provided on:  Other: n/a     Recommendations:      Individual Therapy  1 hourq1-2 months      Visit Diagnosis:     1. Adjustment disorder with mixed anxiety and depressed mood        Diagnostic Impression:        Psychological condition is generally improving       Shelly Le, PhD @ 4:03 PM
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
## 2022-01-13 ENCOUNTER — HOSPITAL ENCOUNTER (OUTPATIENT)
Dept: PSYCHOLOGY | Facility: CLINIC | Age: 67
Discharge: HOME | End: 2022-01-13
Payer: MEDICARE

## 2022-01-13 DIAGNOSIS — F43.23 ADJUSTMENT DISORDER WITH MIXED ANXIETY AND DEPRESSED MOOD: Primary | ICD-10-CM

## 2022-01-13 PROCEDURE — 90837 PSYTX W PT 60 MINUTES: CPT | Mod: 95 | Performed by: PSYCHOLOGIST

## 2022-01-13 ASSESSMENT — COGNITIVE AND FUNCTIONAL STATUS - GENERAL
DELUSIONS: NONE OR AGE APPROPRIATE
THOUGHT_PROCESS: WNL
LIBIDO: NO CHANGE
THOUGHT_CONTENT: APPROPRIATE
MOOD: FRUSTRATED;MOTIVATED
AFFECT: FULL RANGE
ATTENTION: WNL
ORIENTATION: FULLY ORIENTED
EYE_CONTACT: WNL
IMPULSE CONTROL: INTACT
RECENT MEMORY: WNL
APPEARANCE: WELL GROOMED
AROUSAL LEVEL: ATTENTIVE
PSYCHOMOTOR FUNCTIONING: WNL
APPETITE: INCREASED
SPEECH: REGULAR
REMOTE MEMORY: WNL
SLEEP_WAKE_CYCLE: NO CHANGE
PERCEPTUAL FUNCTION: NORMAL
CONCENTRATION: WNL
EST. PREMORBID INTELLIGENCE: ABOVE AVERAGE

## 2022-01-13 NOTE — PROGRESS NOTES
Outpatient Psychology Progress Note    Duration: 1 hour  [place] Cooper : 1955, a 66 y.o. female, for follow up visit.  Reason:  She has been experiencing adjustment issues.    HPI:   Chief Complaint   Patient presents with   • Psychotherapy   • Anxiety   • Depression       Current Evaluation:     Mental Status Evaluation:  Arousal Level: Attentive  Appearance: Well Groomed  Speech: Regular  Psychomotor Functioning: WNL  Eye Contact: WNL  Est. Premorbid Intelligence: Above average  Orientation: Fully oriented  Attention: WNL  Concentration: WNL  Recent Memory: WNL  Remote Memory: WNL  Thought Content: Appropriate  Thought Process: WNL  Perceptual Function: Normal  Delusions: None or age appropriate  Sleeping: No Change  Appetite: Increased  Libido: No change  Affect: Full Range  Mood: Frustrated,Motivated    Comments:      Additional Assessments done this visit:     [place] Suicide Severity Rating Scale:  Not indicated            Safe-T Assessment:  Not indicated        Session Summary:   Pt recovering from breakthrough COVID infection.  Feeling better.  Multiple family members also sick.    Pt c some weight gain during illness.  Pt concerned but not overly reactive and handling appropriately by resuming diet program which had worked well for her.    Pt has been highly stressed and frustrated by difficulty of handling MCC related issues, made overly complicated by her former employer (eg had to speak to 7 people in 7 different areas, given little time to complete paperwork). Less tearful and overwhelmed now but we spoke about this as reactivating trauma experienced when pt first went on Disability. Financial stress includes possibly  no longer having MS meds paid for.    Pt acknowledges an emotional component to MCC and aging and we will continue to address this.      Goals Addressed                 This Visit's Progress    • Maximize adjustment and acceptance of limitations   On track           Interventions  Acceptance & Adjustment  Anxiety Reduction Techniques  Monitoring of Symptoms    Psychoeducation provided on:  Other: n/a     Recommendations:      Individual Therapy  1 hour1-2 times monthly      Visit Diagnosis:     1. Adjustment disorder with mixed anxiety and depressed mood        Diagnostic Impression:        Psychological condition is generally worsening       Shelly Le, PhD @ 4:05 PM

## 2022-02-03 ENCOUNTER — HOSPITAL ENCOUNTER (OUTPATIENT)
Dept: PSYCHOLOGY | Facility: CLINIC | Age: 67
Discharge: HOME | End: 2022-02-03
Payer: MEDICARE

## 2022-02-03 DIAGNOSIS — F43.23 ADJUSTMENT DISORDER WITH MIXED ANXIETY AND DEPRESSED MOOD: Primary | ICD-10-CM

## 2022-02-03 PROCEDURE — 90834 PSYTX W PT 45 MINUTES: CPT | Mod: 95 | Performed by: PSYCHOLOGIST

## 2022-02-03 ASSESSMENT — COGNITIVE AND FUNCTIONAL STATUS - GENERAL
IMPULSE CONTROL: INTACT
DELUSIONS: NONE OR AGE APPROPRIATE
AROUSAL LEVEL: ATTENTIVE
APPETITE: DECREASED
ORIENTATION: FULLY ORIENTED
SPEECH: REGULAR
PSYCHOMOTOR FUNCTIONING: WNL
CONCENTRATION: WNL
SLEEP_WAKE_CYCLE: NO CHANGE
APPEARANCE: WELL GROOMED
MOOD: FRUSTRATED;MOTIVATED
LIBIDO: NO CHANGE
REMOTE MEMORY: WNL
EYE_CONTACT: WNL
EST. PREMORBID INTELLIGENCE: ABOVE AVERAGE
INSIGHT: INTACT
ATTENTION: WNL
AFFECT: FULL RANGE
THOUGHT_CONTENT: APPROPRIATE
PERCEPTUAL FUNCTION: NORMAL
RECENT MEMORY: WNL
THOUGHT_PROCESS: WNL

## 2022-02-03 NOTE — PROGRESS NOTES
Outpatient Psychology Progress Note    Duration: 45 minutes  Africa Cooper : 1955, a 66 y.o. female, for follow up visit.  Reason:  She has been experiencing adjustment issues.    HPI:   Chief Complaint   Patient presents with   • Psychotherapy   • Anxiety       Current Evaluation:     Mental Status Evaluation:  Arousal Level: Attentive  Appearance: Well Groomed  Speech: Regular  Psychomotor Functioning: WNL  Eye Contact: WNL  Est. Premorbid Intelligence: Above average  Orientation: Fully oriented  Attention: WNL  Concentration: WNL  Recent Memory: WNL  Remote Memory: WNL  Thought Content: Appropriate  Thought Process: WNL  Insight: Intact  Perceptual Function: Normal  Delusions: None or age appropriate  Sleeping: No Change  Appetite: Decreased  Libido: No change  Affect: Full Range  Mood: Frustrated,Motivated    Comments:      Additional Assessments done this visit:     [place] Suicide Severity Rating Scale:  Not indicated            Safe-T Assessment:  Not indicated        Session Summary:   Pt c current digestive concerns, leading to decreased mood and more frustration and irritability.  However, pt also dealing c issues re aging and lifestyle change.  Pt is not meeting new friends as expected in her community or in her activities.  She is feeling more lonely, [place]ano as  looks to return to p/t work. Ideas for re-engaging discussed. Pt's MS related limitations may render some activities, such as bowling and golf, less enjoyable because pt cannot perform at an acceptable level. Pt voices fear of becoming more housebound and isolated as her mother is.     Goals Addressed                 This Visit's Progress    • Maximize adjustment and acceptance of limitations   Not on track          Interventions  Acceptance & Adjustment  Goal Setting  Monitoring of Symptoms    Psychoeducation provided on:  Depression Anxiety     Recommendations:      Individual Therapy  1 hour1-2 times monthly      Visit Diagnosis:      1. Adjustment disorder with mixed anxiety and depressed mood        Diagnostic Impression:        Psychological condition is generally worsening       Shelly Le, PhD @ 4:00 PM

## 2022-02-24 ENCOUNTER — HOSPITAL ENCOUNTER (OUTPATIENT)
Dept: PSYCHOLOGY | Facility: CLINIC | Age: 67
Discharge: HOME | End: 2022-02-24
Payer: MEDICARE

## 2022-02-24 DIAGNOSIS — F43.23 ADJUSTMENT DISORDER WITH MIXED ANXIETY AND DEPRESSED MOOD: Primary | ICD-10-CM

## 2022-02-24 PROCEDURE — 90834 PSYTX W PT 45 MINUTES: CPT | Mod: 95 | Performed by: PSYCHOLOGIST

## 2022-02-24 ASSESSMENT — COGNITIVE AND FUNCTIONAL STATUS - GENERAL
PERCEPTUAL FUNCTION: NORMAL
THOUGHT_PROCESS: WNL
DELUSIONS: NONE OR AGE APPROPRIATE
REMOTE MEMORY: WNL
SLEEP_WAKE_CYCLE: DECREASED
EST. PREMORBID INTELLIGENCE: ABOVE AVERAGE
IMPULSE CONTROL: INTACT
RECENT MEMORY: WNL
THOUGHT_CONTENT: APPROPRIATE
APPEARANCE: WELL GROOMED
INSIGHT: INTACT
MOOD: FRUSTRATED;MOTIVATED
ATTENTION: WNL
SPEECH: REGULAR
EYE_CONTACT: WNL
AFFECT: FULL RANGE
APPETITE: INCREASED
LIBIDO: NO CHANGE
AROUSAL LEVEL: ATTENTIVE
ORIENTATION: FULLY ORIENTED
CONCENTRATION: WNL
PSYCHOMOTOR FUNCTIONING: WNL

## 2022-02-24 NOTE — PROGRESS NOTES
Outpatient Psychology Progress Note    Duration: 50 minutes  Africa Cooper : 1955, a 66 y.o. female, for follow up visit.  Reason:  She has been experiencing adjustment issues.    HPI:   Chief Complaint   Patient presents with   • Psychotherapy   • Anxiety   • Depression       Current Evaluation:     Mental Status Evaluation:  Arousal Level: Attentive  Appearance: Well Groomed  Speech: Regular  Psychomotor Functioning: WNL  Eye Contact: WNL  Est. Premorbid Intelligence: Above average  Orientation: Fully oriented  Attention: WNL  Concentration: WNL  Recent Memory: WNL  Remote Memory: WNL  Thought Content: Appropriate  Thought Process: WNL  Insight: Intact  Perceptual Function: Normal  Delusions: None or age appropriate  Sleeping: Decreased  Appetite: Increased  Libido: No change  Affect: Full Range  Mood: Frustrated,Motivated    Comments:      Additional Assessments done this visit:     [place] Suicide Severity Rating Scale:  Not indicated            Safe-T Assessment:  Not indicated        Session Summary:   Session conducted virtually as pt having diverticulitis flare.  Appropriately distressed but taking needed dietary measures.    Pt has gained weight.  Is frustrated c herself and we discussed precipitants, ie cognitions (it won't hurt to cheat a little), and triggers (boredom).  Developed plan to change.  Worked on sleep hygiene issues as well.     Pt showing better adjustment to [facility] status vs being on Disability.      Goals Addressed                 This Visit's Progress    • Maximize adjustment and acceptance of limitations   On track          Interventions  Acceptance & Adjustment  Cognitive Behavior Training  Goal Setting  Monitoring of Symptoms    Psychoeducation provided on:  Other: sleep and diet issues     Recommendations:      Individual Therapy  45 minutes2 times monthly      Visit Diagnosis:     1. Adjustment disorder with mixed anxiety and depressed mood        Diagnostic Impression:         Psychological condition is generally improving       Shelly Le, PhD @ 4:02 PM

## 2022-03-31 ENCOUNTER — HOSPITAL ENCOUNTER (OUTPATIENT)
Dept: PSYCHOLOGY | Facility: CLINIC | Age: 67
Discharge: HOME | End: 2022-03-31
Payer: MEDICARE

## 2022-03-31 DIAGNOSIS — F43.23 ADJUSTMENT DISORDER WITH MIXED ANXIETY AND DEPRESSED MOOD: Primary | ICD-10-CM

## 2022-03-31 PROCEDURE — 90837 PSYTX W PT 60 MINUTES: CPT | Mod: 95 | Performed by: PSYCHOLOGIST

## 2022-03-31 ASSESSMENT — COGNITIVE AND FUNCTIONAL STATUS - GENERAL
LIBIDO: NO CHANGE
DELUSIONS: NONE OR AGE APPROPRIATE
REMOTE MEMORY: WNL
AROUSAL LEVEL: ATTENTIVE
MOOD: FRUSTRATED;MOTIVATED
PERCEPTUAL FUNCTION: NORMAL
EST. PREMORBID INTELLIGENCE: ABOVE AVERAGE
EYE_CONTACT: WNL
APPEARANCE: WELL GROOMED
THOUGHT_CONTENT: APPROPRIATE
CONCENTRATION: WNL
ORIENTATION: FULLY ORIENTED
APPETITE: NO CHANGE
SPEECH: REGULAR
PSYCHOMOTOR FUNCTIONING: WNL
INSIGHT: INTACT
ATTENTION: WNL
THOUGHT_PROCESS: WNL
SLEEP_WAKE_CYCLE: NO CHANGE
RECENT MEMORY: WNL
IMPULSE CONTROL: INTACT
AFFECT: FULL RANGE

## 2022-03-31 NOTE — PROGRESS NOTES
Outpatient Psychology Progress Note    Duration: 55 minutes  Africa Cooper : 1955, a 66 y.o. female, for follow up visit.  Reason:  She has been experiencing adjustment issues.    HPI:   Chief Complaint   Patient presents with   • Psychotherapy   • Anxiety   • Depression       Current Evaluation:     Mental Status Evaluation:  Arousal Level: Attentive  Appearance: Well Groomed  Speech: Regular  Psychomotor Functioning: WNL  Eye Contact: WNL  Est. Premorbid Intelligence: Above average  Orientation: Fully oriented  Attention: WNL  Concentration: WNL  Recent Memory: WNL  Remote Memory: WNL  Thought Content: Appropriate  Thought Process: WNL  Insight: Intact  Perceptual Function: Normal  Delusions: None or age appropriate  Sleeping: No Change  Appetite: No Change  Libido: No change  Affect: Full Range  Mood: Frustrated, Motivated    Comments:      Additional Assessments done this visit:     [place] Suicide Severity Rating Scale:  Not indicated            Safe-T Assessment:  Not indicated        Session Summary:   Pt generally doing well.  Dealing c most concerns in an appropriate and productive way. Continues to struggle a little c weight but back on a more restrictive program to promote further weight loss.    Encouraged focus on issues within pt's control.      Goals Addressed                 This Visit's Progress    • Maximize adjustment and acceptance of limitations   On track          Interventions  Acceptance & Adjustment  Goal Setting  Monitoring of Symptoms    Psychoeducation provided on:  Other: n/a     Recommendations:      Individual Therapy  1 hour1-2 times monthly      Visit Diagnosis:     1. Adjustment disorder with mixed anxiety and depressed mood        Diagnostic Impression:        Psychological condition is generally improving       Shelly Le, PhD @ 3:57 PM

## 2022-04-21 ENCOUNTER — HOSPITAL ENCOUNTER (OUTPATIENT)
Dept: PSYCHOLOGY | Facility: CLINIC | Age: 67
Discharge: HOME | End: 2022-04-21
Payer: MEDICARE

## 2022-04-21 DIAGNOSIS — F43.23 ADJUSTMENT DISORDER WITH MIXED ANXIETY AND DEPRESSED MOOD: Primary | ICD-10-CM

## 2022-04-21 PROCEDURE — 90837 PSYTX W PT 60 MINUTES: CPT | Performed by: PSYCHOLOGIST

## 2022-04-21 ASSESSMENT — COGNITIVE AND FUNCTIONAL STATUS - GENERAL
REMOTE MEMORY: WNL
THOUGHT_CONTENT: APPROPRIATE
ORIENTATION: FULLY ORIENTED
APPETITE: INCREASED
APPEARANCE: WELL GROOMED
INSIGHT: INTACT
MOOD: FRUSTRATED;MOTIVATED;ANXIOUS
AROUSAL LEVEL: ATTENTIVE
SPEECH: REGULAR
CONCENTRATION: WNL
LIBIDO: NO CHANGE
PERCEPTUAL FUNCTION: NORMAL
THOUGHT_PROCESS: WNL
AFFECT: FULL RANGE
IMPULSE CONTROL: INTACT
DELUSIONS: NONE OR AGE APPROPRIATE
PSYCHOMOTOR FUNCTIONING: WNL
ATTENTION: WNL
EST. PREMORBID INTELLIGENCE: ABOVE AVERAGE
EYE_CONTACT: WNL
RECENT MEMORY: WNL
SLEEP_WAKE_CYCLE: NO CHANGE

## 2022-04-21 NOTE — PROGRESS NOTES
"Outpatient Psychology Progress Note    Duration: 55 minutes  Af[place] Cooper : 1955, a 66 y.o. female, for follow up visit.  Reason:  She has been experiencing adjustment issues.    HPI:   Chief Complaint   Patient presents with   • Psychotherapy   • Depression   • Anxiety       Current Evaluation:     Mental Status Evaluation:  Arousal Level: Attentive  Appearance: Well Groomed  Speech: Regular  Psychomotor Functioning: WNL  Eye Contact: WNL  Est. Premorbid Intelligence: Above average  Orientation: Fully oriented  Attention: WNL  Concentration: WNL  Recent Memory: WNL  Remote Memory: WNL  Thought Content: Appropriate  Thought Process: WNL  Insight: Intact  Perceptual Function: Normal  Delusions: None or age appropriate  Sleeping: No Change  Appetite: Increased  Libido: No change  Affect: Full Range  Mood: Frustrated, Motivated, Anxious    Comments:      Additional Assessments done this visit:     [place] Suicide Severity Rating Scale:  Not indicated            Safe-T Assessment:  Not indicated        Session Summary:   Session focused on self sabotaging behaviors in diet, regrets over past decisions and worries for the future.  Discussed the contribution cognitions making on mood and behavior.  Working on reducing tendency to \"beat herself up\". Discussed role of past in present reactions.       Goals Addressed                 This Visit's Progress    • Maximize adjustment and acceptance of limitations   Not on track          Interventions  Acceptance & Adjustment  Cognitive Behavior Training  Monitoring of Symptoms    Psychoeducation provided on:  Depression Anxiety     Recommendations:      Individual Therapy  1 hour1-2 times monthly      Visit Diagnosis:     1. Adjustment disorder with mixed anxiety and depressed mood        Diagnostic Impression:        Psychological condition is generally worsening       Shelly Le, PhD @ 4:16 PM  "

## 2022-05-19 ENCOUNTER — HOSPITAL ENCOUNTER (OUTPATIENT)
Dept: PSYCHOLOGY | Facility: CLINIC | Age: 67
Discharge: HOME | End: 2022-05-19
Payer: MEDICARE

## 2022-05-19 DIAGNOSIS — F43.23 ADJUSTMENT DISORDER WITH MIXED ANXIETY AND DEPRESSED MOOD: Primary | ICD-10-CM

## 2022-05-19 PROCEDURE — 90834 PSYTX W PT 45 MINUTES: CPT | Mod: 95 | Performed by: PSYCHOLOGIST

## 2022-05-19 ASSESSMENT — COGNITIVE AND FUNCTIONAL STATUS - GENERAL
MOOD: FRUSTRATED;MOTIVATED;ANXIOUS
APPEARANCE: WELL GROOMED
IMPULSE CONTROL: INTACT
LIBIDO: NO CHANGE
SLEEP_WAKE_CYCLE: NO CHANGE
REMOTE MEMORY: WNL
PSYCHOMOTOR FUNCTIONING: WNL
THOUGHT_PROCESS: WNL
AROUSAL LEVEL: ATTENTIVE
RECENT MEMORY: WNL
AFFECT: FULL RANGE
DELUSIONS: NONE OR AGE APPROPRIATE
EYE_CONTACT: WNL
ATTENTION: WNL
APPETITE: INCREASED
PERCEPTUAL FUNCTION: NORMAL
EST. PREMORBID INTELLIGENCE: ABOVE AVERAGE
THOUGHT_CONTENT: APPROPRIATE
SPEECH: REGULAR
CONCENTRATION: WNL
INSIGHT: INTACT
ORIENTATION: FULLY ORIENTED

## 2022-05-19 NOTE — PROGRESS NOTES
Outpatient Psychology Progress Note    Duration: 45 minutes  Af[place] Cooper : 1955, a 66 y.o. female, for follow up visit.  Reason:  She has been experiencing adjustment issues.    HPI:   Chief Complaint   Patient presents with   • Psychotherapy   • Anxiety   • Depression     MS       Current Evaluation:     Mental Status Evaluation:  Arousal Level: Attentive  Appearance: Well Groomed  Speech: Regular  Psychomotor Functioning: WNL  Eye Contact: WNL  Est. Premorbid Intelligence: Above average  Orientation: Fully oriented  Attention: WNL  Concentration: WNL  Recent Memory: WNL  Remote Memory: WNL  Thought Content: Appropriate  Thought Process: WNL  Insight: Intact  Perceptual Function: Normal  Delusions: None or age appropriate  Sleeping: No Change  Appetite: Increased  Libido: No change  Affect: Full Range  Mood: Frustrated, Motivated, Anxious    Comments:      Additional Assessments done this visit:     [place] Suicide Severity Rating Scale:  Not indicated            Safe-T Assessment:  Not indicated        Session Summary:   Pt dealing c difficult family situation as well as frustrations relating to her new home. Responsive to support. Identified all or none thinking in financial concerns; working to change.     Pt addressing concerns re meds and re possible cog issues from MS appropriately.       Goals Addressed                 This Visit's Progress    • Maximize adjustment and acceptance of limitations   On track          Interventions  Acceptance & Adjustment  Goal Setting  Monitoring of Symptoms    Psychoeducation provided on:  Other: n/a     Recommendations:      Individual Therapy  1 hour1-2 times monthly      Visit Diagnosis:     1. Adjustment disorder with mixed anxiety and depressed mood        Diagnostic Impression:        Psychological condition is generally improving       Shelly Le, PhD @ 4:32 PM

## 2022-06-09 ENCOUNTER — HOSPITAL ENCOUNTER (OUTPATIENT)
Dept: PSYCHOLOGY | Facility: CLINIC | Age: 67
Discharge: HOME | End: 2022-06-09
Payer: MEDICARE

## 2022-06-09 DIAGNOSIS — F43.23 ADJUSTMENT DISORDER WITH MIXED ANXIETY AND DEPRESSED MOOD: Primary | ICD-10-CM

## 2022-06-09 PROCEDURE — 90834 PSYTX W PT 45 MINUTES: CPT | Mod: 95 | Performed by: PSYCHOLOGIST

## 2022-06-09 NOTE — PROGRESS NOTES
Outpatient Psychology Progress Note    Duration: 45 minutes  Af[place] Cooper : 1955, a 66 y.o. female, for follow up visit.  Reason:  She has been experiencing adjustment issues.    HPI:   Chief Complaint   Patient presents with   • Anxiety   • Psychotherapy   • Depression       Current Evaluation:     Mental Status Evaluation:       Comments:      Additional Assessments done this visit:     [place] Suicide Severity Rating Scale:  Not indicated            Safe-T Assessment:  Not indicated        Session Summary:   Pt doing well.  More social, better adjusted to new community.  Reports family is coping better c a recent death. Pt trying to be helpful to extended family.    Pt maintains weight loss and diet.       Goals Addressed    None         Interventions  Monitoring of Symptoms    Psychoeducation provided on:  Other: n/a     Recommendations:      Individual Therapy  1 hourmonthly      Visit Diagnosis:     1. Adjustment disorder with mixed anxiety and depressed mood        Diagnostic Impression:        Psychological condition is generally improving       Shelly Le, PhD @ 3:57 PM   gradual onset

## 2022-06-28 ENCOUNTER — APPOINTMENT (OUTPATIENT)
Dept: URBAN - METROPOLITAN AREA CLINIC 203 | Age: 67
Setting detail: DERMATOLOGY
End: 2022-06-29

## 2022-06-28 DIAGNOSIS — L57.8 OTHER SKIN CHANGES DUE TO CHRONIC EXPOSURE TO NONIONIZING RADIATION: ICD-10-CM

## 2022-06-28 DIAGNOSIS — Z11.52 ENCOUNTER FOR SCREENING FOR COVID-19: ICD-10-CM

## 2022-06-28 PROCEDURE — OTHER COUNSELING: OTHER

## 2022-06-28 PROCEDURE — OTHER SCREENING FOR COVID-19: OTHER

## 2022-06-28 PROCEDURE — OTHER SUNSCREEN RECOMMENDATIONS: OTHER

## 2022-06-28 PROCEDURE — 99213 OFFICE O/P EST LOW 20 MIN: CPT

## 2022-06-28 ASSESSMENT — LOCATION DETAILED DESCRIPTION DERM
LOCATION DETAILED: LEFT MEDIAL BREAST 10-11:00 REGION
LOCATION DETAILED: LEFT MEDIAL BREAST 11-12:00 REGION
LOCATION DETAILED: EPIGASTRIC SKIN

## 2022-06-28 ASSESSMENT — LOCATION SIMPLE DESCRIPTION DERM
LOCATION SIMPLE: ABDOMEN
LOCATION SIMPLE: LEFT BREAST

## 2022-06-28 ASSESSMENT — LOCATION ZONE DERM: LOCATION ZONE: TRUNK

## 2022-07-07 ENCOUNTER — HOSPITAL ENCOUNTER (OUTPATIENT)
Dept: PSYCHOLOGY | Facility: CLINIC | Age: 67
Discharge: HOME | End: 2022-07-07
Payer: MEDICARE

## 2022-07-07 DIAGNOSIS — F43.23 ADJUSTMENT DISORDER WITH MIXED ANXIETY AND DEPRESSED MOOD: Primary | ICD-10-CM

## 2022-07-07 PROCEDURE — 90834 PSYTX W PT 45 MINUTES: CPT | Mod: 95 | Performed by: PSYCHOLOGIST

## 2022-07-07 ASSESSMENT — COGNITIVE AND FUNCTIONAL STATUS - GENERAL
DELUSIONS: NONE OR AGE APPROPRIATE
APPETITE: INCREASED
ATTENTION: WNL
PSYCHOMOTOR FUNCTIONING: WNL
RECENT MEMORY: WNL
INSIGHT: INTACT
REMOTE MEMORY: WNL
LIBIDO: NO CHANGE
ORIENTATION: FULLY ORIENTED
EST. PREMORBID INTELLIGENCE: ABOVE AVERAGE
EYE_CONTACT: WNL
THOUGHT_CONTENT: APPROPRIATE
AROUSAL LEVEL: ATTENTIVE
APPEARANCE: WELL GROOMED
PERCEPTUAL FUNCTION: NORMAL
CONCENTRATION: WNL
SLEEP_WAKE_CYCLE: NO CHANGE
AFFECT: FULL RANGE
THOUGHT_PROCESS: WNL;WORRY
SPEECH: REGULAR
IMPULSE CONTROL: INTACT
MOOD: ANXIOUS

## 2022-07-07 NOTE — PROGRESS NOTES
Outpatient Psychology Progress Note    Duration: 50 minutes  Africa Cooper : 1955, a 66 y.o. female, for follow up visit.  Reason:  She has been experiencing adjustment issues.    HPI:   Chief Complaint   Patient presents with   • Anxiety   • Psychotherapy   • Depression       Current Evaluation:     Mental Status Evaluation:  Arousal Level: Attentive  Appearance: Well Groomed  Speech: Regular  Psychomotor Functioning: WNL  Eye Contact: WNL  Est. Premorbid Intelligence: Above average  Orientation: Fully oriented  Attention: WNL  Concentration: WNL  Recent Memory: WNL  Remote Memory: WNL  Thought Content: Appropriate  Thought Process: WNL, Worry  Insight: Intact  Perceptual Function: Normal  Delusions: None or age appropriate  Sleeping: No Change  Appetite: Increased  Libido: No change  Affect: Full Range  Mood: Anxious    Comments:      Additional Assessments done this visit:     [place] Suicide Severity Rating Scale:  Not indicated              Safe-T Assessment:  Not indicated        Session Summary:   Pt presents c increased anxiety.  Post years of stability, she had possible recent MS exacerbation, c fatigue, LE weakness and back/trunk pain.  Sx now reduced but pt feels worried about possible future flare ups.  Has MRI and medical f/u planned.     Pt voices other worries as well, about finances, past choices.  Is making a list of concerns and working to eliminate those beyond her control in order to be focused on those she can address.    Session focused on anxiety mgmt through socialization, exercise, reading as well as resolving issues of concern where possible, eg eliminating expensive diet now that she has reached her target weight, managing fatigue and heat exposure.       Goals Addressed                 This Visit's Progress    • Maximize adjustment and acceptance of limitations   Not on track          Interventions  Acceptance & Adjustment  Anxiety Reduction Techniques  Monitoring of  Symptoms    Psychoeducation provided on:  Depression Anxiety     Recommendations:      Individual Therapy  1 hour1-2 times monthly      Visit Diagnosis:     1. Adjustment disorder with mixed anxiety and depressed mood        Diagnostic Impression:        Psychological condition is generally worsening       Shelly Le, PhD @ 4:09 PM

## 2022-07-28 ENCOUNTER — HOSPITAL ENCOUNTER (OUTPATIENT)
Dept: PSYCHOLOGY | Facility: CLINIC | Age: 67
Discharge: HOME | End: 2022-07-28
Payer: MEDICARE

## 2022-07-28 DIAGNOSIS — F43.23 ADJUSTMENT DISORDER WITH MIXED ANXIETY AND DEPRESSED MOOD: Primary | ICD-10-CM

## 2022-07-28 PROCEDURE — 90837 PSYTX W PT 60 MINUTES: CPT | Performed by: PSYCHOLOGIST

## 2022-07-28 ASSESSMENT — COGNITIVE AND FUNCTIONAL STATUS - GENERAL
RECENT MEMORY: WNL
AFFECT: FULL RANGE;TENSE
THOUGHT_PROCESS: WNL;WORRY
INSIGHT: INTACT
AROUSAL LEVEL: ATTENTIVE
SPEECH: REGULAR
APPETITE: INCREASED
ORIENTATION: FULLY ORIENTED
EYE_CONTACT: WNL
PERCEPTUAL FUNCTION: NORMAL
IMPULSE CONTROL: INTACT
THOUGHT_CONTENT: APPROPRIATE
APPEARANCE: WELL GROOMED
EST. PREMORBID INTELLIGENCE: ABOVE AVERAGE
LIBIDO: NO CHANGE
ATTENTION: WNL
MOOD: ANXIOUS;DEPRESSED
REMOTE MEMORY: WNL
SLEEP_WAKE_CYCLE: NO CHANGE
DELUSIONS: NONE OR AGE APPROPRIATE
PSYCHOMOTOR FUNCTIONING: WNL
CONCENTRATION: WNL

## 2022-07-28 NOTE — PROGRESS NOTES
Outpatient Psychology Progress Note    Duration: 1 hour  Africa Cooper : 1955, a 66 y.o. female, for follow up visit.  Reason:  She has been experiencing adjustment issues.    HPI:   Chief Complaint   Patient presents with   • Psychotherapy   • Depression   • Anxiety       Current Evaluation:     Mental Status Evaluation:  Arousal Level: Attentive  Appearance: Well Groomed  Speech: Regular  Psychomotor Functioning: WNL  Eye Contact: WNL  Est. Premorbid Intelligence: Above average  Orientation: Fully oriented  Attention: WNL  Concentration: WNL  Recent Memory: WNL  Remote Memory: WNL  Thought Content: Appropriate  Thought Process: WNL, Worry  Insight: Intact  Perceptual Function: Normal  Delusions: None or age appropriate  Sleeping: No Change  Appetite: Increased  Libido: No change  Affect: Full Range, Tense  Mood: Anxious, Depressed    Comments:      Additional Assessments done this visit:     [place] Suicide Severity Rating Scale:  Not indicated              Safe-T Assessment:  Not indicated        Session Summary:   Session focused on upsetting family visit and navigating the challenging feelings and dynamics.     Pt also unhappy about recent weight gain, acknowledges eating out more and exercising less as issues.  Pt not hopeless , is motivated to resume diet and return to her desired weight.       Goals Addressed                 This Visit's Progress    • Maximize adjustment and acceptance of limitations   Not on track          Interventions  Acceptance & Adjustment  Communication Skills development  Monitoring of Symptoms    Psychoeducation provided on:  Other: n/a     Recommendations:      Individual Therapy  1 hour1-2 times monthly      Visit Diagnosis:     1. Adjustment disorder with mixed anxiety and depressed mood        Diagnostic Impression:        Psychological condition is generally worsening       Shelly Le, PhD @ 4:36 PM

## 2022-08-18 ENCOUNTER — HOSPITAL ENCOUNTER (OUTPATIENT)
Dept: PSYCHOLOGY | Facility: CLINIC | Age: 67
Discharge: HOME | End: 2022-08-18
Payer: MEDICARE

## 2022-08-18 DIAGNOSIS — F43.23 ADJUSTMENT DISORDER WITH MIXED ANXIETY AND DEPRESSED MOOD: Primary | ICD-10-CM

## 2022-08-18 PROCEDURE — 90834 PSYTX W PT 45 MINUTES: CPT | Mod: 95 | Performed by: PSYCHOLOGIST

## 2022-08-18 ASSESSMENT — COGNITIVE AND FUNCTIONAL STATUS - GENERAL
RECENT MEMORY: WNL
LIBIDO: NO CHANGE
ORIENTATION: FULLY ORIENTED
AFFECT: FULL RANGE;TENSE
AROUSAL LEVEL: ATTENTIVE
PSYCHOMOTOR FUNCTIONING: WNL
REMOTE MEMORY: WNL
IMPULSE CONTROL: IMPAIRED, MINIMALLY
DELUSIONS: NONE OR AGE APPROPRIATE
PERCEPTUAL FUNCTION: NORMAL
MOOD: ANXIOUS;DEPRESSED;FRUSTRATED
APPEARANCE: WELL GROOMED
INSIGHT: INTACT
ATTENTION: WNL
CONCENTRATION: WNL
EYE_CONTACT: WNL
THOUGHT_PROCESS: WNL;WORRY
SPEECH: REGULAR
APPETITE: INCREASED
THOUGHT_CONTENT: APPROPRIATE
EST. PREMORBID INTELLIGENCE: ABOVE AVERAGE
SLEEP_WAKE_CYCLE: NO CHANGE

## 2022-08-18 NOTE — PROGRESS NOTES
Outpatient Psychology Progress Note    Duration: 45 minutes  Africa Cooper : 1955, a 66 y.o. female, for follow up visit.  Reason:  She has been experiencing adjustment issues.    HPI:   Chief Complaint   Patient presents with   • Psychotherapy   • Depression   • Anxiety       Current Evaluation:     Mental Status Evaluation:  Arousal Level: Attentive  Appearance: Well Groomed  Speech: Regular  Psychomotor Functioning: WNL  Eye Contact: WNL  Est. Premorbid Intelligence: Above average  Orientation: Fully oriented  Attention: WNL  Concentration: WNL  Recent Memory: WNL  Remote Memory: WNL  Thought Content: Appropriate  Thought Process: WNL, Worry  Insight: Intact  Perceptual Function: Normal  Delusions: None or age appropriate  Sleeping: No Change  Appetite: Increased  Libido: No change  Affect: Full Range, Tense  Mood: Anxious, Depressed, Frustrated    Comments:      Additional Assessments done this visit:     [place] Suicide Severity Rating Scale:  Not indicated              Safe-T Assessment:  Not indicated        Session Summary:   Pt more anxious and depressed.  Multiple stressors.  Is overeating and self critical.  Discussed being kinder and more helpful to herself. Provided support around other concerns.       Goals Addressed                 This Visit's Progress    • Maximize adjustment and acceptance of limitations   Not on track          Interventions  Acceptance & Adjustment  Cognitive Behavior Training  Monitoring of Symptoms    Psychoeducation provided on:  Depression Anxiety     Recommendations:      Individual Therapy  45 minutes2-4 times monthly      Visit Diagnosis:     1. Adjustment disorder with mixed anxiety and depressed mood        Diagnostic Impression:        Psychological condition is generally worsening       Shelly Le, PhD @ 3:54 PM

## 2022-09-01 ENCOUNTER — HOSPITAL ENCOUNTER (OUTPATIENT)
Dept: PSYCHOLOGY | Facility: CLINIC | Age: 67
Discharge: HOME | End: 2022-09-01
Payer: MEDICARE

## 2022-09-01 DIAGNOSIS — F43.23 ADJUSTMENT DISORDER WITH MIXED ANXIETY AND DEPRESSED MOOD: Primary | ICD-10-CM

## 2022-09-01 PROCEDURE — 90834 PSYTX W PT 45 MINUTES: CPT | Mod: 95 | Performed by: PSYCHOLOGIST

## 2022-09-01 ASSESSMENT — COGNITIVE AND FUNCTIONAL STATUS - GENERAL
ATTENTION: WNL
INSIGHT: INTACT
DELUSIONS: NONE OR AGE APPROPRIATE
THOUGHT_CONTENT: APPROPRIATE
APPETITE: INCREASED
APPEARANCE: WELL GROOMED
RECENT MEMORY: WNL
LIBIDO: NO CHANGE
SPEECH: REGULAR
REMOTE MEMORY: WNL
THOUGHT_PROCESS: WNL;WORRY
EYE_CONTACT: WNL
AFFECT: FULL RANGE
IMPULSE CONTROL: INTACT
PSYCHOMOTOR FUNCTIONING: WNL
MOOD: ANXIOUS;HOPEFUL
CONCENTRATION: WNL
AROUSAL LEVEL: ATTENTIVE
SLEEP_WAKE_CYCLE: NO CHANGE
PERCEPTUAL FUNCTION: NORMAL
EST. PREMORBID INTELLIGENCE: ABOVE AVERAGE
ORIENTATION: FULLY ORIENTED

## 2022-09-01 NOTE — PROGRESS NOTES
Outpatient Psychology Progress Note    Duration: 45 minutes  Africa Cooper : 1955, a 66 y.o. female, for follow up visit.  Reason:  She has been experiencing adjustment issues.    HPI:   Chief Complaint   Patient presents with    Psychotherapy    Anxiety    Depression     ms       Current Evaluation:     Mental Status Evaluation:  Arousal Level: Attentive  Appearance: Well Groomed  Speech: Regular  Psychomotor Functioning: WNL  Eye Contact: WNL  Est. Premorbid Intelligence: Above average  Orientation: Fully oriented  Attention: WNL  Concentration: WNL  Recent Memory: WNL  Remote Memory: WNL  Thought Content: Appropriate  Thought Process: WNL, Worry  Insight: Intact  Perceptual Function: Normal  Delusions: None or age appropriate  Sleeping: No Change  Appetite: Increased  Libido: No change  Affect: Full Range  Mood: Anxious, Hopeful    Comments:      Additional Assessments done this visit:     [place] Suicide Severity Rating Scale:  Not indicated              Safe-T Assessment:  Not indicated        Session Summary:   Pt c improved adjustment. Has appropriate concerns re family issues but communicating and setting boundaries well.  Frustrated by slow healing of hip tendon but understands that there is no need for surgery and does need ongoing PT. Less pain. Back on a good weight loss plan.     Reinforced gains.       Goals Addressed                 This Visit's Progress     Maximize adjustment and acceptance of limitations   On track          Interventions  Acceptance & Adjustment  Monitoring of Symptoms    Psychoeducation provided on:  Other: n/a     Recommendations:      Individual Therapy  1 hourmonthly      Visit Diagnosis:     1. Adjustment disorder with mixed anxiety and depressed mood        Diagnostic Impression:        Psychological condition is generally improving       Shelly Le, PhD @ 3:54 PM

## 2022-09-29 ENCOUNTER — HOSPITAL ENCOUNTER (OUTPATIENT)
Dept: PSYCHOLOGY | Facility: CLINIC | Age: 67
Discharge: HOME | End: 2022-09-29
Payer: MEDICARE

## 2022-09-29 DIAGNOSIS — F43.23 ADJUSTMENT DISORDER WITH MIXED ANXIETY AND DEPRESSED MOOD: Primary | ICD-10-CM

## 2022-09-29 PROCEDURE — 90837 PSYTX W PT 60 MINUTES: CPT | Performed by: PSYCHOLOGIST

## 2022-09-29 ASSESSMENT — COGNITIVE AND FUNCTIONAL STATUS - GENERAL
SLEEP_WAKE_CYCLE: NO CHANGE
RECENT MEMORY: WNL
DELUSIONS: NONE OR AGE APPROPRIATE
LIBIDO: NO CHANGE
THOUGHT_PROCESS: WNL;WORRY
EYE_CONTACT: WNL
INSIGHT: INTACT
APPEARANCE: WELL GROOMED
IMPULSE CONTROL: INTACT
CONCENTRATION: WNL
AROUSAL LEVEL: ATTENTIVE
APPETITE: NO CHANGE
ORIENTATION: FULLY ORIENTED
MOOD: ANXIOUS;HOPEFUL;FRUSTRATED
ATTENTION: WNL
SPEECH: REGULAR
THOUGHT_CONTENT: APPROPRIATE
REMOTE MEMORY: WNL
EST. PREMORBID INTELLIGENCE: ABOVE AVERAGE
PERCEPTUAL FUNCTION: NORMAL
AFFECT: FULL RANGE;TENSE
PSYCHOMOTOR FUNCTIONING: WNL

## 2022-09-29 NOTE — PROGRESS NOTES
Outpatient Psychology Progress Note    Duration: 1 hour  Africa Cooper : 1955, a 66 y.o. female, for follow up visit.  Reason:  She has been experiencing adjustment issues.    HPI:   Chief Complaint   Patient presents with    Psychotherapy    Depression    Anxiety       Current Evaluation:     Mental Status Evaluation:  Arousal Level: Attentive  Appearance: Well Groomed  Speech: Regular  Psychomotor Functioning: WNL  Eye Contact: WNL  Est. Premorbid Intelligence: Above average  Orientation: Fully oriented  Attention: WNL  Concentration: WNL  Recent Memory: WNL  Remote Memory: WNL  Thought Content: Appropriate  Thought Process: WNL, Worry  Insight: Intact  Perceptual Function: Normal  Delusions: None or age appropriate  Sleeping: No Change  Appetite: No Change  Libido: No change  Affect: Full Range, Tense  Mood: Anxious, Hopeful, Frustrated    Comments:      Additional Assessments done this visit:     [place] Suicide Severity Rating Scale:  Not indicated              Safe-T Assessment:  Not indicated        Session Summary:   Pt presents c tense affect.  Addressed family stressors and pt's coping strategies. Encouraged setting aside time for  as appts and activities are interfering c this.  Looked at pt's tendency to make impulsive decisions to take on leadership roles and then feel regret.       Goals Addressed                 This Visit's Progress     Maximize adjustment and acceptance of limitations   On track          Interventions  Monitoring of Symptoms  Self Regulation Strategies    Psychoeducation provided on:  Other: n/a     Recommendations:      Individual Therapy  1 hour2 times monthly      Visit Diagnosis:     1. Adjustment disorder with mixed anxiety and depressed mood        Diagnostic Impression:        Psychological condition is generally showing no change       Shelly Le, PhD @ 4:09 PM

## 2022-11-03 ENCOUNTER — HOSPITAL ENCOUNTER (OUTPATIENT)
Dept: PSYCHOLOGY | Facility: CLINIC | Age: 67
Discharge: HOME | End: 2022-11-03
Payer: MEDICARE

## 2022-11-03 DIAGNOSIS — F43.23 ADJUSTMENT DISORDER WITH MIXED ANXIETY AND DEPRESSED MOOD: Primary | ICD-10-CM

## 2022-11-03 PROCEDURE — 90837 PSYTX W PT 60 MINUTES: CPT | Performed by: PSYCHOLOGIST

## 2022-11-03 ASSESSMENT — COGNITIVE AND FUNCTIONAL STATUS - GENERAL
SPEECH: REGULAR
IMPULSE CONTROL: INTACT
EYE_CONTACT: WNL
ATTENTION: WNL
INSIGHT: INTACT
THOUGHT_CONTENT: APPROPRIATE
PERCEPTUAL FUNCTION: NORMAL
THOUGHT_PROCESS: WNL;WORRY
RECENT MEMORY: WNL
PSYCHOMOTOR FUNCTIONING: WNL
DELUSIONS: NONE OR AGE APPROPRIATE
CONCENTRATION: WNL
REMOTE MEMORY: WNL
EST. PREMORBID INTELLIGENCE: ABOVE AVERAGE
AFFECT: FULL RANGE;TENSE
LIBIDO: NO CHANGE
MOOD: ANXIOUS;FRUSTRATED
SLEEP_WAKE_CYCLE: NO CHANGE
ORIENTATION: FULLY ORIENTED
APPEARANCE: WELL GROOMED
APPETITE: NO CHANGE
AROUSAL LEVEL: ATTENTIVE

## 2022-11-03 NOTE — PROGRESS NOTES
Outpatient Psychology Progress Note    Duration: 55 minutes  Af[place] Cooper : 1955, a 67 y.o. female, for follow up visit.  Reason:  She has been experiencing adjustment issues.    HPI:   Chief Complaint   Patient presents with    Psychotherapy    Anxiety    Depression       Current Evaluation:     Mental Status Evaluation:  Arousal Level: Attentive  Appearance: Well Groomed  Speech: Regular  Psychomotor Functioning: WNL  Eye Contact: WNL  Est. 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 Recent bout of diverticulitis may be related to this stress.  Pt agrees to set limits and refocus on things that add more value to her life.    Because of my upcoming long-term, pt will transition to provider she has worked c for group support.  This provider can provide indiv tx.      Goals Addressed                 This Visit's Progress     Maximize adjustment and acceptance of limitations   Not on track          Interventions  No Further Follow Up  Goal Setting  Monitoring of Symptoms    Psychoeducation provided on:  Other: n/a     Recommendations:      No Further sessions; Patient pending discharge  1 hour2 times monthly      Visit Diagnosis:     1. Adjustment disorder with mixed anxiety and depressed mood        Diagnostic Impression:        Psychological condition is generally worsening        Shelly Le, PhD @ 4:06 PM

## 2022-11-21 ENCOUNTER — APPOINTMENT (OUTPATIENT)
Dept: URBAN - METROPOLITAN AREA CLINIC 203 | Age: 67
Setting detail: DERMATOLOGY
End: 2022-11-30

## 2022-11-21 DIAGNOSIS — Z11.52 ENCOUNTER FOR SCREENING FOR COVID-19: ICD-10-CM

## 2022-11-21 DIAGNOSIS — L29.9 PRURITUS, UNSPECIFIED: ICD-10-CM

## 2022-11-21 PROCEDURE — OTHER MIPS QUALITY: OTHER

## 2022-11-21 PROCEDURE — 99214 OFFICE O/P EST MOD 30 MIN: CPT

## 2022-11-21 PROCEDURE — OTHER PRESCRIPTION MEDICATION MANAGEMENT: OTHER

## 2022-11-21 PROCEDURE — OTHER SCREENING FOR COVID-19: OTHER

## 2022-11-21 PROCEDURE — OTHER PRESCRIPTION: OTHER

## 2022-11-21 RX ORDER — MIRTAZAPINE 7.5 MG/1
30 TABLET, FILM COATED ORAL QHS
Qty: 30 | Refills: 2 | Status: ERX | COMMUNITY
Start: 2022-11-21

## 2022-11-21 ASSESSMENT — LOCATION SIMPLE DESCRIPTION DERM
LOCATION SIMPLE: LEFT ELBOW
LOCATION SIMPLE: RIGHT ELBOW
LOCATION SIMPLE: ABDOMEN
LOCATION SIMPLE: CHEST

## 2022-11-21 ASSESSMENT — LOCATION DETAILED DESCRIPTION DERM
LOCATION DETAILED: EPIGASTRIC SKIN
LOCATION DETAILED: RIGHT ELBOW
LOCATION DETAILED: LEFT MEDIAL SUPERIOR CHEST
LOCATION DETAILED: LEFT ELBOW

## 2022-11-21 ASSESSMENT — LOCATION ZONE DERM
LOCATION ZONE: ARM
LOCATION ZONE: TRUNK

## 2022-11-21 NOTE — PROCEDURE: PRESCRIPTION MEDICATION MANAGEMENT
Plan: Sarna itching cream, ceraVe anti itch, aquaphor body spray
Render In Strict Bullet Format?: No
Initiate Treatment: mirtazapine 7.5 mg tablet Qhs
Detail Level: Zone
Modify Regimen: Continue gabapentin 100mg; pt previously discontinued

## 2022-12-19 ENCOUNTER — APPOINTMENT (OUTPATIENT)
Dept: URBAN - METROPOLITAN AREA CLINIC 203 | Age: 67
Setting detail: DERMATOLOGY
End: 2022-12-26

## 2022-12-19 DIAGNOSIS — L29.9 PRURITUS, UNSPECIFIED: ICD-10-CM

## 2022-12-19 DIAGNOSIS — Z11.52 ENCOUNTER FOR SCREENING FOR COVID-19: ICD-10-CM

## 2022-12-19 PROCEDURE — OTHER MIPS QUALITY: OTHER

## 2022-12-19 PROCEDURE — OTHER PRESCRIPTION MEDICATION MANAGEMENT: OTHER

## 2022-12-19 PROCEDURE — OTHER SCREENING FOR COVID-19: OTHER

## 2022-12-19 PROCEDURE — 99214 OFFICE O/P EST MOD 30 MIN: CPT

## 2022-12-19 ASSESSMENT — LOCATION SIMPLE DESCRIPTION DERM
LOCATION SIMPLE: ABDOMEN
LOCATION SIMPLE: LEFT ELBOW

## 2022-12-19 ASSESSMENT — LOCATION DETAILED DESCRIPTION DERM
LOCATION DETAILED: LEFT ELBOW
LOCATION DETAILED: EPIGASTRIC SKIN

## 2022-12-19 ASSESSMENT — LOCATION ZONE DERM
LOCATION ZONE: ARM
LOCATION ZONE: TRUNK

## 2022-12-19 NOTE — PROCEDURE: PRESCRIPTION MEDICATION MANAGEMENT
Plan: Sarna itching cream, ceraVe anti itch, aquaphor body spray
Render In Strict Bullet Format?: No
Detail Level: Zone
Modify Regimen: Continue gabapentin 100mg; pt previously discontinued
Discontinue Regimen: ***mirtazapine 7.5mg - pt reports increased anxiety

## 2023-03-10 NOTE — ADDENDUM NOTE
Encounter addended by: Shelly Le, PhD on: 3/10/2023 9:09 AM   Actions taken: Level of Service modified

## 2023-08-16 ENCOUNTER — APPOINTMENT (OUTPATIENT)
Dept: URBAN - METROPOLITAN AREA CLINIC 203 | Age: 68
Setting detail: DERMATOLOGY
End: 2023-08-21

## 2023-08-16 DIAGNOSIS — L57.8 OTHER SKIN CHANGES DUE TO CHRONIC EXPOSURE TO NONIONIZING RADIATION: ICD-10-CM

## 2023-08-16 DIAGNOSIS — L20.89 OTHER ATOPIC DERMATITIS: ICD-10-CM

## 2023-08-16 PROBLEM — L30.9 DERMATITIS, UNSPECIFIED: Status: ACTIVE | Noted: 2023-08-16

## 2023-08-16 PROCEDURE — OTHER PRESCRIPTION MEDICATION MANAGEMENT: OTHER

## 2023-08-16 PROCEDURE — 99213 OFFICE O/P EST LOW 20 MIN: CPT

## 2023-08-16 PROCEDURE — OTHER SUNSCREEN RECOMMENDATIONS: OTHER

## 2023-08-16 PROCEDURE — OTHER COUNSELING: OTHER

## 2023-08-16 PROCEDURE — OTHER PRESCRIPTION: OTHER

## 2023-08-16 RX ORDER — TRIAMCINOLONE ACETONIDE 1 MG/G
CREAM TOPICAL BID PRN
Qty: 80 | Refills: 4 | Status: ERX | COMMUNITY
Start: 2023-08-16

## 2023-08-16 ASSESSMENT — LOCATION SIMPLE DESCRIPTION DERM
LOCATION SIMPLE: LEFT PRETIBIAL REGION
LOCATION SIMPLE: LEFT BREAST

## 2023-08-16 ASSESSMENT — LOCATION ZONE DERM
LOCATION ZONE: TRUNK
LOCATION ZONE: LEG

## 2023-08-16 ASSESSMENT — LOCATION DETAILED DESCRIPTION DERM
LOCATION DETAILED: LEFT DISTAL PRETIBIAL REGION
LOCATION DETAILED: LEFT MEDIAL BREAST 11-12:00 REGION
LOCATION DETAILED: LEFT MEDIAL BREAST 10-11:00 REGION

## 2023-08-16 NOTE — PROCEDURE: PRESCRIPTION MEDICATION MANAGEMENT
Detail Level: Zone
Render In Strict Bullet Format?: No
Initiate Treatment: triamcinolone acetonide 0.1 % topical cream Bid prn\\nQuantity: 80.0 g  Days Supply: 30\\nSig: Apply bid to affected area for eczema on legs\\n\\nIf rash still present in one month patient to come in for bx.

## 2023-09-20 ENCOUNTER — APPOINTMENT (OUTPATIENT)
Dept: URBAN - METROPOLITAN AREA CLINIC 203 | Age: 68
Setting detail: DERMATOLOGY
End: 2023-09-25

## 2023-09-20 DIAGNOSIS — L20.89 OTHER ATOPIC DERMATITIS: ICD-10-CM

## 2023-09-20 PROBLEM — L30.9 DERMATITIS, UNSPECIFIED: Status: ACTIVE | Noted: 2023-09-20

## 2023-09-20 PROCEDURE — 99213 OFFICE O/P EST LOW 20 MIN: CPT

## 2023-09-20 PROCEDURE — OTHER REASSURANCE: OTHER

## 2023-09-20 PROCEDURE — OTHER PRESCRIPTION MEDICATION MANAGEMENT: OTHER

## 2023-09-20 ASSESSMENT — LOCATION DETAILED DESCRIPTION DERM: LOCATION DETAILED: LEFT DISTAL PRETIBIAL REGION

## 2023-09-20 ASSESSMENT — LOCATION ZONE DERM: LOCATION ZONE: LEG

## 2023-09-20 ASSESSMENT — LOCATION SIMPLE DESCRIPTION DERM: LOCATION SIMPLE: LEFT PRETIBIAL REGION

## 2023-09-20 NOTE — PROCEDURE: PRESCRIPTION MEDICATION MANAGEMENT
Detail Level: Zone
Render In Strict Bullet Format?: No
Plan: Rash resolved at todays visit. If recurs patient to come in for bx
Continue Regimen: triamcinolone acetonide 0.1 % topical cream Bid prn\\nQuantity: 80.0 g  Days Supply: 30\\nSig: Apply bid to affected area for eczema on legs as needed

## 2025-07-28 ENCOUNTER — HOSPITAL ENCOUNTER (OUTPATIENT)
Dept: PHYSICAL THERAPY | Facility: REHABILITATION | Age: 70
Setting detail: THERAPIES SERIES
Discharge: HOME | End: 2025-07-28
Attending: PSYCHIATRY & NEUROLOGY
Payer: MEDICARE

## 2025-07-28 DIAGNOSIS — R20.9 DISTURBANCE OF SKIN SENSATION: ICD-10-CM

## 2025-07-28 DIAGNOSIS — R20.0 TACTILE ANESTHESIA: ICD-10-CM

## 2025-07-28 DIAGNOSIS — R20.2 PARESTHESIA: ICD-10-CM

## 2025-07-28 DIAGNOSIS — R26.89 OTHER ABNORMALITIES OF GAIT AND MOBILITY: Primary | ICD-10-CM

## 2025-07-28 DIAGNOSIS — R53.1 WEAKNESS: ICD-10-CM

## 2025-07-28 DIAGNOSIS — G35 MULTIPLE SCLEROSIS (CMS/HCC): ICD-10-CM

## 2025-07-28 PROCEDURE — 97162 PT EVAL MOD COMPLEX 30 MIN: CPT | Mod: GP

## 2025-07-28 PROCEDURE — 97530 THERAPEUTIC ACTIVITIES: CPT | Mod: GP

## 2025-07-28 RX ORDER — VILAZODONE HYDROCHLORIDE 10 MG/1
20 TABLET ORAL DAILY
COMMUNITY

## 2025-07-28 NOTE — OP PT TREATMENT LOG
PT Neuro Exercises Current Session Time    THER ACT   CPT 38518 TOTAL TIME FOR SESSION 8-22 min    Vitals/subj/pain  Yes    Assessments  Yes    HEP     Orthotic [place] trialed during eval, edu on benefits of use    Yes    Bed mobility Assessed  Yes    Transfer training Assessed Yes   Education Educated regarding impairments and PT POC moving forward. Patient provided with Welcome Letter, which includes attendance policy. Provided education regarding cancellation and no-show policy. Education regarding the importance of participation and regular attendance to maximize goal attainment.    Yes    THER EX  CPT 88888 TOTAL TIME FOR SESSION Not performed    Education     STRETCHING     Stretching by patient Seated hamstring  Incline board     CARDIOVASCULAR     Nu Step     Stationary Bike     Treadmill     Standing Ther-Ex Hip abduction  Hip extension       Supine Ther-Ex Bridge  Bent knee fall out    NEURO RE-ED  CPT 18671 TOTAL TIME FOR SESSION Not performed    FGA  Yes    TUG     Education     COORDINATION     POSTURAL RE-ED     PRE-GAIT ACTIVITIES      BALANCE TRAINING      Sitting balance     Standing balance - static FT, EO/EC  Semi tandem       Standing balance - dynamic Hurdles  Step taps   Step ups  Stepping stones      Standing balance - varied surface Airex     Rockerboard     GAIT TRAINING  CPT 18317 TOTAL TIME FOR SESSION Not performed    6 MWT, gait speed  Yes    Ambulation      Dynamic gait      Stair negotiation Assessed Yes    Curb negotiation     Ramp negotiation     Outdoor ambulation     BWS/vector training     MANUAL  CPT 15988 TOTAL TIME FOR SESSION Not performed    Stretching by therapist/PROM     Mobilization      MODALITIES  CPT 04012 TOTAL TIME FOR SESSION Not performed    Ice     Heat     ATTENDED E-STIM  CPT 82014 TOTAL TIME FOR SESSION Not performed    Attended E-Stim       ORTHOTICS TRAINING CPT 11024 TOTAL TIME FOR SESSION Not performed           GROUP  CPT 63137 TOTAL TIME FOR SESSION Not  performed

## 2025-07-28 NOTE — Clinical Note
Dear DR. Fontaine,    Thank you for this referral. Please review the attached notes and plan of care for your approval.  Please contact our department with any questions.     Sincerely,     Pauline Christine, PT  [address] [place] [address]  Phone [phone]  Fax  [phone]    By co-signing this Plan of Care (POC) you agree to the following:  I have reviewed the the Plan of Care established by the therapist within this document and certify that the services are skilled and medically necessary. I have reviewed the plan and recommend that these services continue to meet the goals stated in this document.    PHYSICIAN SIGNATURE: __________________________________     DATE: ___________________  TIME: _____________           Physical Therapy Plan of Care 25   Effective from: 2025  Effective to: 2025    Plan ID: [id number]            Participants as of Finalize on 2025    Name Type Comments Contact Info    Pauline Christine, PT Physical Therapist      Mae Fontaine PCP - General  [phone]       Last Progress Notes Note     Author: Pauline Christine, PT Status: Signed Last edited: 2025  8:00 AM       Physical Therapy Evaluation    [facility] Fax: [phone]    PT EVALUATION FOR OUTPATIENT THERAPY    Patient: Africa Cooper    MRN: [id number]  : 1955 69 y.o.     Referring Physician: Mae Fontaine MD  Date of Visit: 2025      Certification Dates:  25 through 25  2x/week for 4-6 weeks      Recommended Frequency & Duration:  2 times/week for up to 6 weeks     Diagnosis:   1. Other abnormalities of gait and mobility    2. Multiple sclerosis (CMS/HCC)    3. Weakness    4. Tactile anesthesia    5. Paresthesia    6. Disturbance of skin sensation        Chief Complaints:   Chief Complaint   Patient presents with    Balance Deficits    Dec Strength    Dec Coordination       Precautions:    Precautions additional comments:      Past Medical History: No past medical history on  "file.    Past Surgical History: No past surgical history on file.      LEARNING ASSESSMENT    Assessment completed:  Yes    Learner name:  Africa    Learner: Patient    Learning Barriers:  Learning barriers: No Barriers    Preferred Language: English     Needed: No    Education Provided:   Method: Discussion  Readiness: acceptance  Response: Verbalizes understanding      CO-LEARNER ASSESSMENT:    Completed: No      Welcome letter discussed: No      OBJECTIVE MEASUREMENTS/DATA:    Time In Session:  Start Time: 0810  Stop Time: 0900  Time Calculation (min): 50 min   Assessment and Plan - 07/28/25 0814          Assessment    Plan of Care reviewed and patient/family in agreement Yes     System Pathology/Pathophysiology Noted neuromuscular     Functional Limitations in Following Categories (PT Eval) community/leisure     Rehab Potential/Prognosis good, to achieve stated therapy goals     Problem List decreased flexibility;impaired balance;impaired coordination;impaired sensation;pain;decreased strength;impaired sensory feedback     Clinical Assessment Patient is a 69 y.o. who presented to OP PT on 7/28/25 for IE secondary to balance impairments from MS. She reports she recently was discharged from PT which was focusing on her scoliosis in order to focus on her balance. She reports she has 0.5\" heel lift in her R sneaker due to her LLD. At [facility], she presents with mildly decreased strength, dynamic balance impairments, mild gait impairments, and impaired sensation and sensory feedback. At [facility], she was assessed on 6MWT, gait speed, FGA, and PSFS. Based on her performance with objective assessments, patient is indicated to be at an increased risk of falls as she scores below age matched norms. She reports she feels most off balance when ambulating on uneven surfaces (inclines, declines, hiking trails) but also reports difficulty with stair navigation without a HR. During evaluation, she trialed Naboso inserts due " to sensory impairments in BL feet, where she did report mild improvement in stability when ambulating - will benefit from continued trials, delnao with high level balance activities. Pt will benefit from skilled OP PT 2x/week for 4-6 weeks to address impairments, maximize her functional mobility, and decrease her risk of falls.     Plan and Recommendations Cont with Naboso trials, high level balance/coordination, uneven surfaces, Slip ?     Planned Services CPT 29643 Aquatic therapy/exercises;CPT 98996 Gait training;CPT 82278 Neuromuscular Reeducation;CPT 57252 Self-care/Home management training;CPT 25950 Therapeutic exercises;CPT 62156 Wheelchair management;CPT 94515 Electrical stimulation ATTENDED;CPT 02864 Hot/Cold Packs therapy;CPT 60772 Electrical stimulation UNATTENDED;CPT 56951 Work conditioning;CPT 81141 Therapeutic activities;CPT 71371 Orthotics/Prosthetics management and training (Subsequent encounters);CPT 34185 Orthotics training (Initial encounter);CPT 27885 Manual therapy;CPT 36953 Canalith repositioning procedure/maneuvers     Comments/Additional Services [facility]               General Information - 07/28/25 0814          Session Details    Document Type initial evaluation     Mode of Treatment individual therapy        General Information    History of present illness/functional impairment Patient is a 69 y.o. with a PMH of MS, Cervical spondylosis, scoliosis, Meniere's disease, Diverticulosis, DJD (c/s and t/s). She reports participating in Aquatic exercise and works out with a . She recently had a fall, requiring stiches on her chin from when she lost her footing and fell forward. She also reports a few falls prior to that. She is unsure what caused the falls but does report increased numbness in her feet.     Patient/Family/Caregiver Comments/Observations Pt agreeable to PT evaluation               Pain/Vitals - 07/28/25 0804          Pain Assessment    Currently in pain  No/Denies               Falls/Food Screening - 07/28/25 0814          Initial Falls Assessment    One or more falls in the last year Yes     How many times 2 or more     Was the patient injured in any fall Yes     Fall prevention interventions recommended [place] and re-orient the patient to the environment;Educate and re-educate the patient on safety strategies        Food Insecurity    Within the past 12 months, you worried that your food would run out before you got the money to buy more. Never true     Within the past 12 months, the food you bought just didn't last and you didn't have money to get more. Never true               PT - 07/28/25 0814          Physical Therapy    Physical Therapy Specialty Spinal Cord PT;Traditional Neuro PT        PT Plan    Frequency of treatment 2 times/week     PT Duration 6 weeks     PT Custom Frequency and Duration 2x/week for 4-6 weeks     PT Cert From 07/28/25     PT Cert To 09/05/25                  Living Environment    Living Environment - 07/28/25 0814          Living Environment    People in Home spouse     Living Arrangements house              PLOF:    Prior Level of Function - 07/28/25 0814          OTHER    Previous level of function Retired, previously worked in [email or web address].              Sensory Tests    Sensory Testing - 07/28/25 0814          Sensory Assessment    Sensory Assessment sensation intact, lower extremities;sensation intact except     Sensation Impaired Location(s) right LE;left LE     Sensory Subjective Reports numbness;tingling   BL feet and LE's             Skin    Skin Assessment - 07/28/25 0814          LE Skin    Skin Condition Intact              MMT    Manual Muscle Tests - 07/28/25 0814          RIGHT: Lower Extremity Manual Muscle Test Assessment    Hip Flexion gross movement (4+/5) good plus     Hip Abduction gross movement (4+/5) good plus     Hip Internal Rotation gross movement (4+/5) good plus     Hip External Rotation gross movement (4+/5)  "good plus     Knee Flexion strength (5/5) normal     Knee Extension strength (5/5) normal     Ankle Dorsiflexion gross movement (5/5) normal     Ankle Plantarflexion gross movement (5/5) normal     Ankle Inversion gross movement (5/5) normal     Ankle Eversion gross movement (5/5) normal        LEFT: Lower Extremity Manual Muscle Test Assessment    Hip Flexion gross movement (4+/5) good plus     Hip Abduction gross movement (4+/5) good plus     Hip Internal Rotation gross movement (4/5) good     Hip External Rotation gross movement (4/5) good     Knee Flexion strength (5/5) normal     Knee Extension strength (5/5) normal     Ankle Dorsiflexion gross movement (5/5) normal     Ankle Plantarflexion gross movement (5/5) normal     Ankle Inversion gross movement (5/5) normal     Ankle Eversion gross movement (5/5) normal              Transfers    Transfers - 07/28/25 0814          Bed Mobility    [place] independent        Sit to Stand Transfer    [place], Sit to Stand Transfer independent        Stand to Sit Transfer    [place], Stand to Sit Transfer independent        Stand Pivot Transfer    [place], Stand Pivot/Stand Step Transfer independent              Gait and Mobility    Gait and Mobility - 07/28/25 0814          Gait Training    [place], Gait independent     Variable surfaces Flat surface     Assistive Device none     Comment Patient ambulates independently with no significant gait impairments. Mildly decreased enedelia. One instance of L toe catch during 6MWT - self corrected. Reports she has 0.5\" heel lift in R sneaker due to LLD from scoliosis.        Stairs Assessment    [place], Stairs modified independence     Assistive Device railing     Handrail Location (Stairs) both sides     Number of Steps (Stairs) 4     Stair Height 6 inches     Ascending Stairs Technique step-over-step     Descending Stairs Technique step-over-step     Comment No LOB noted, completed 2x w HR. 1x " completed with Joe knott.              Neuromuscular/Motor    Neuromuscular/Motor - 07/28/25 0814          Motor Skills    Motor Skills coordination;functional endurance;muscle tone     Coordination bilateral;lower extremity;gross motor deficit     Functional Endurance Good     Muscle Tone bilateral;lower extremity(s);WNL;WFL     Comment: Muscle Tone Reports she experiences spasms at night.              Balance/Posture    Balance and Posture - 07/28/25 0814          Balance Assessment    Balance Assessment standing dynamic balance     Dynamic Standing Balance standing;unsupported;mild impairment     Comment, Balance FGA of 18/30 indicates increased risk of falls as she scores below the cut off score of 22/30. Most challenged w amb with HT/HN, 180 degree turn, stepping over an object, amb with EC, and tandem walking. One instance of toe catch, which was self corrected during 6MWT.              Outcome Measures    PT Outcome Measures - 07/28/25 0814          Objective Outcome Measures    6 Minute Walk Test 1419 ft     Gait Speed (m/sec) 1.13 m/sec     FGA Score (out of 30 total) 18        Patient Specific Functional Scale (PSFS)    PSFS ACTIVITY 1 Walking on uneven surfaces (unpaved surfaces)     PSFS ANSWER 1 6     PSFS ACTIVITY 2 Going up/down steps, no HR     PSFS ANSWER 2 6     PSFS ACTIVITY 3 Sensory impairments to feet after exercise     PSFS ANSWER 3 5     PSFS Sum of Activity Scores 17     PSFS Number of Activities 3     PSFS Percent Score 56.67 %                 Goals         <enter goal here> (pt-stated)       Mutually agreed upon pain goal       PT Neuro Goals       Patient Stated:     Long term goals   Long Term Goals Time Frame Result Comment/Progress   Patient will navigate FF steps without HR, demonstrating improvement in dynamic balance    4-6 weeks     Improve 6mWT by 75 feet or more  indicating improvement in endurance and activity tolerance.   4-6 weeks     Improve PSFS average score by 2  points or more to meet the MDC indicating significant improvement in patient's self perceived ability to complete the stated functional tasks.    4-6 weeks     Improve FGA score by 6 points or more indicating improving dynamic balance and decreasing falls risk.   4-6 weeks     Progress gait speed by 0.05 m/sec or more without decline in safety or quality indicating improvement in gait speed and increased safety during ambulation in the home and community.   4-6 weeks     Pt and caregiver will be mod I with HEP 6 weeks                     TREATMENT PLAN:    PT Neuro Exercises Current Session Time    THER ACT   CPT 00332 TOTAL TIME FOR SESSION 8-22 min    Vitals/subj/pain  Yes    Assessments  Yes    HEP     Orthotic Nabosos trialed during eval, edu on benefits of use    Yes    Bed mobility Assessed  Yes    Transfer training Assessed Yes   Education Educated regarding impairments and PT POC moving forward. Patient provided with Welcome Letter, which includes attendance policy. Provided education regarding cancellation and no-show policy. Education regarding the importance of participation and regular attendance to maximize goal attainment.    Yes    THER EX  CPT 71236 TOTAL TIME FOR SESSION Not performed    Education     STRETCHING     Stretching by patient Seated hamstring  Incline board     CARDIOVASCULAR     Nu Step     Stationary Bike     Treadmill     Standing Ther-Ex Hip abduction  Hip extension       Supine Ther-Ex Bridge  Bent knee fall out    NEURO RE-ED  CPT 19708 TOTAL TIME FOR SESSION Not performed    FGA  Yes    TUG     Education     COORDINATION     POSTURAL RE-ED     PRE-GAIT ACTIVITIES      BALANCE TRAINING      Sitting balance     Standing balance - static FT, EO/EC  Semi tandem       Standing balance - dynamic Hurdles  Step taps   Step ups  Stepping stones      Standing balance - varied surface Airex     Rockerboard     GAIT TRAINING  CPT 14043 TOTAL TIME FOR SESSION Not performed    6 MWT, gait speed   Yes    Ambulation      Dynamic gait      Stair negotiation Assessed Yes    Curb negotiation     Ramp negotiation     Outdoor ambulation     BWS/vector training     MANUAL  CPT 79626 TOTAL TIME FOR SESSION Not performed    Stretching by therapist/PROM     Mobilization      MODALITIES  CPT 40034 TOTAL TIME FOR SESSION Not performed    Ice     Heat     ATTENDED E-STIM  CPT 94398 TOTAL TIME FOR SESSION Not performed    Attended E-Stim       ORTHOTICS TRAINING CPT 76441 TOTAL TIME FOR SESSION Not performed           GROUP  CPT 55705 TOTAL TIME FOR SESSION Not performed              ASSESSMENT:    This 69 y.o. year old female presents to PT with above stated diagnosis. Physical Therapy evaluation reveals decreased flexibility, impaired balance, impaired coordination, impaired sensation, pain, decreased strength, impaired sensory feedback resulting in community/leisure limitations. Africa Cooper will benefit from skilled PT services to address limitation, work towards rehab and patient goals and maximize PLOF of chosen ADLs.     Planned Services: The patient's treatment will include CPT 57043 Aquatic therapy/exercises, CPT 09770 Gait training, CPT 48818 Neuromuscular Reeducation, CPT 89741 Self-care/Home management training, CPT 29646 Therapeutic exercises, CPT 23139 Wheelchair management, CPT 46160 Electrical stimulation ATTENDED, CPT 87487 Hot/Cold Packs therapy, CPT 90800 Electrical stimulation UNATTENDED, CPT 70613 Work conditioning, CPT 76417 Therapeutic activities, CPT 37324 Orthotics/Prosthetics management and training (Subsequent encounters), CPT 61092 Orthotics training (Initial encounter), CPT 86490 Manual therapy, CPT 07998 Canalith repositioning procedure/maneuvers,[facility].     Pauline Christine PT                           Current Participants as of 7/28/2025    Name Type Comments Contact Info    Pauline Christine PT Physical Therapist      Electronically signed by Pauline Christine PT at 7/28/2025 1217 EDT    Mae BULLARD  Zhen PCP - General  [phone]

## 2025-07-28 NOTE — PROGRESS NOTES
Physical Therapy Evaluation    [facility] Fax: [phone]    PT EVALUATION FOR OUTPATIENT THERAPY    Patient: Africa Cooper    MRN: [id number]  : 1955 69 y.o.     Referring Physician: Mae Fontaine MD  Date of Visit: 2025      Certification Dates:  25 through 25  2x/week for 4-6 weeks      Recommended Frequency & Duration:  2 times/week for up to 6 weeks     Diagnosis:   1. Other abnormalities of gait and mobility    2. Multiple sclerosis (CMS/HCC)    3. Weakness    4. Tactile anesthesia    5. Paresthesia    6. Disturbance of skin sensation        Chief Complaints:   Chief Complaint   Patient presents with    Balance Deficits    Dec Strength    Dec Coordination       Precautions:    Precautions additional comments:      Past Medical History: No past medical history on file.    Past Surgical History: No past surgical history on file.      LEARNING ASSESSMENT    Assessment completed:  Yes    Learner name:  Africa    Learner: Patient    Learning Barriers:  Learning barriers: No Barriers    Preferred Language: English     Needed: No    Education Provided:   Method: Discussion  Readiness: acceptance  Response: Verbalizes understanding      CO-LEARNER ASSESSMENT:    Completed: No      Welcome letter discussed: No      OBJECTIVE MEASUREMENTS/DATA:    Time In Session:  Start Time: 0810  Stop Time: 0900  Time Calculation (min): 50 min   Assessment and Plan - 25 0814          Assessment    Plan of Care reviewed and patient/family in agreement Yes     System Pathology/Pathophysiology Noted neuromuscular     Functional Limitations in Following Categories (PT Eval) community/leisure     Rehab Potential/Prognosis good, to achieve stated therapy goals     Problem List decreased flexibility;impaired balance;impaired coordination;impaired sensation;pain;decreased strength;impaired sensory feedback     Clinical Assessment Patient is a 69 y.o. who presented to OP PT on 25 for IE  "secondary to balance impairments from MS. She reports she recently was discharged from PT which was focusing on her scoliosis in order to focus on her balance. She reports she has 0.5\" heel lift in her R sneaker due to her LLD. At [facility], she presents with mildly decreased strength, dynamic balance impairments, mild gait impairments, and impaired sensation and sensory feedback. At [facility], she was assessed on 6MWT, gait speed, FGA, and PSFS. Based on her performance with objective assessments, patient is indicated to be at an increased risk of falls as she scores below age matched norms. She reports she feels most off balance when ambulating on uneven surfaces (inclines, declines, hiking trails) but also reports difficulty with stair navigation without a HR. During evaluation, she trialed Naboso inserts due to sensory impairments in BL feet, where she did report mild improvement in stability when ambulating - will benefit from continued trials, [place] with high level balance activities. Pt will benefit from skilled OP PT 2x/week for 4-6 weeks to address impairments, maximize her functional mobility, and decrease her risk of falls.     Plan and Recommendations Cont with Naboso trials, high level balance/coordination, uneven surfaces, Slip ?     Planned Services CPT 33444 Aquatic therapy/exercises;CPT 14160 Gait training;CPT 42453 Neuromuscular Reeducation;CPT 10808 Self-care/Home management training;CPT 83415 Therapeutic exercises;CPT 75795 Wheelchair management;CPT 66228 Electrical stimulation ATTENDED;CPT 04567 Hot/Cold Packs therapy;CPT 44778 Electrical stimulation UNATTENDED;CPT 45654 Work conditioning;CPT 53357 Therapeutic activities;CPT 12194 Orthotics/Prosthetics management and training (Subsequent encounters);CPT 64476 Orthotics training (Initial encounter);CPT 50789 Manual therapy;CPT 90817 Canalith repositioning procedure/maneuvers     Comments/Additional Services Vector SS               General " Information - 07/28/25 0814          Session Details    Document Type initial evaluation     Mode of Treatment individual therapy        General Information    History of present illness/functional impairment Patient is a 69 y.o. with a PMH of MS, Cervical spondylosis, scoliosis, Meniere's disease, Diverticulosis, DJD (c/s and t/s). She reports participating in Aquatic exercise and works out with a . She recently had a fall, requiring stiches on her chin from when she lost her footing and fell forward. She also reports a few falls prior to that. She is unsure what caused the falls but does report increased numbness in her feet.     Patient/Family/Caregiver Comments/Observations Pt agreeable to PT evaluation               Pain/Vitals - 07/28/25 0814          Pain Assessment    Currently in pain No/Denies               Falls/Food Screening - 07/28/25 0814          Initial Falls Assessment    One or more falls in the last year Yes     How many times 2 or more     Was the patient injured in any fall Yes     Fall prevention interventions recommended [place] and re-orient the patient to the environment;Educate and re-educate the patient on safety strategies        Food Insecurity    Within the past 12 months, you worried that your food would run out before you got the money to buy more. Never true     Within the past 12 months, the food you bought just didn't last and you didn't have money to get more. Never true               PT - 07/28/25 0814          Physical Therapy    Physical Therapy Specialty Spinal Cord PT;Traditional Neuro PT        PT Plan    Frequency of treatment 2 times/week     PT Duration 6 weeks     PT Custom Frequency and Duration 2x/week for 4-6 weeks     PT Cert From 07/28/25     PT Cert To 09/05/25                  Living Environment    Living Environment - 07/28/25 0814          Living Environment    People in Home spouse     Living Arrangements house              PLOF:    Prior Level  of Function - 07/28/25 0814          OTHER    Previous level of function Retired, previously worked in [employer].              Sensory Tests    Sensory Testing - 07/28/25 0814          Sensory Assessment    Sensory Assessment sensation intact, lower extremities;sensation intact except     Sensation Impaired Location(s) right LE;left LE     Sensory Subjective Reports numbness;tingling   BL feet and LE's             Skin    Skin Assessment - 07/28/25 0814          LE Skin    Skin Condition Intact              MMT    Manual Muscle Tests - 07/28/25 0814          RIGHT: Lower Extremity Manual Muscle Test Assessment    Hip Flexion gross movement (4+/5) good plus     Hip Abduction gross movement (4+/5) good plus     Hip Internal Rotation gross movement (4+/5) good plus     Hip External Rotation gross movement (4+/5) good plus     Knee Flexion strength (5/5) normal     Knee Extension strength (5/5) normal     Ankle Dorsiflexion gross movement (5/5) normal     Ankle Plantarflexion gross movement (5/5) normal     Ankle Inversion gross movement (5/5) normal     Ankle Eversion gross movement (5/5) normal        LEFT: Lower Extremity Manual Muscle Test Assessment    Hip Flexion gross movement (4+/5) good plus     Hip Abduction gross movement (4+/5) good plus     Hip Internal Rotation gross movement (4/5) good     Hip External Rotation gross movement (4/5) good     Knee Flexion strength (5/5) normal     Knee Extension strength (5/5) normal     Ankle Dorsiflexion gross movement (5/5) normal     Ankle Plantarflexion gross movement (5/5) normal     Ankle Inversion gross movement (5/5) normal     Ankle Eversion gross movement (5/5) normal              Transfers    Transfers - 07/28/25 0814          Bed Mobility    [place] independent        Sit to Stand Transfer    [place], Sit to Stand Transfer independent        Stand to Sit Transfer    [place], Stand to Sit Transfer independent        Stand Pivot Transfer     "[place], Stand Pivot/Stand Step Transfer independent              Gait and Mobility    Gait and Mobility - 07/28/25 0814          Gait Training    [place], Gait independent     Variable surfaces Flat surface     Assistive Device none     Comment Patient ambulates independently with no significant gait impairments. Mildly decreased enedelia. One instance of L toe catch during 6MWT - self corrected. Reports she has 0.5\" heel lift in R sneaker due to LLD from scoliosis.        Stairs Assessment    [place], Stairs modified independence     Assistive Device railing     Handrail Location (Stairs) both sides     Number of Steps (Stairs) 4     Stair Height 6 inches     Ascending Stairs Technique step-over-step     Descending Stairs Technique step-over-step     Comment No LOB noted, completed 2x w HR. 1x completed with Joe knott.              Neuromuscular/Motor    Neuromuscular/Motor - 07/28/25 0814          Motor Skills    Motor Skills coordination;functional endurance;muscle tone     Coordination bilateral;lower extremity;gross motor deficit     Functional Endurance Good     Muscle Tone bilateral;lower extremity(s);WNL;WFL     Comment: Muscle Tone Reports she experiences spasms at night.              Balance/Posture    Balance and Posture - 07/28/25 0814          Balance Assessment    Balance Assessment standing dynamic balance     Dynamic Standing Balance standing;unsupported;mild impairment     Comment, Balance FGA of 18/30 indicates increased risk of falls as she scores below the cut off score of 22/30. Most challenged w amb with HT/HN, 180 degree turn, stepping over an object, amb with EC, and tandem walking. One instance of toe catch, which was self corrected during 6MWT.              Outcome Measures    PT Outcome Measures - 07/28/25 0814          Objective Outcome Measures    6 Minute Walk Test 1419 ft     Gait Speed (m/sec) 1.13 m/sec     FGA Score (out of 30 total) 18        Patient Specific " Functional Scale (PSFS)    PSFS ACTIVITY 1 Walking on uneven surfaces (unpaved surfaces)     PSFS ANSWER 1 6     PSFS ACTIVITY 2 Going up/down steps, no HR     PSFS ANSWER 2 6     PSFS ACTIVITY 3 Sensory impairments to feet after exercise     PSFS ANSWER 3 5     PSFS Sum of Activity Scores 17     PSFS Number of Activities 3     PSFS Percent Score 56.67 %                 Goals         <enter goal here> (pt-stated)       Mutually agreed upon pain goal       PT Neuro Goals       Patient Stated:     Long term goals   Long Term Goals Time Frame Result Comment/Progress   Patient will navigate FF steps without HR, demonstrating improvement in dynamic balance    4-6 weeks     Improve 6mWT by 75 feet or more  indicating improvement in endurance and activity tolerance.   4-6 weeks     Improve PSFS average score by 2 points or more to meet the MDC indicating significant improvement in patient's self perceived ability to complete the stated functional tasks.    4-6 weeks     Improve FGA score by 6 points or more indicating improving dynamic balance and decreasing falls risk.   4-6 weeks     Progress gait speed by 0.05 m/sec or more without decline in safety or quality indicating improvement in gait speed and increased safety during ambulation in the home and community.   4-6 weeks     Pt and caregiver will be mod I with HEP 6 weeks                     TREATMENT PLAN:    PT Neuro Exercises Current Session Time    THER ACT   CPT 06912 TOTAL TIME FOR SESSION 8-22 min    Vitals/subj/pain  Yes    Assessments  Yes    HEP     Orthotic Nabosos trialed during eval, edu on benefits of use    Yes    Bed mobility Assessed  Yes    Transfer training Assessed Yes   Education Educated regarding impairments and PT POC moving forward. Patient provided with Welcome Letter, which includes attendance policy. Provided education regarding cancellation and no-show policy. Education regarding the importance of participation and regular attendance to  maximize goal attainment.    Yes    THER EX  CPT 82920 TOTAL TIME FOR SESSION Not performed    Education     STRETCHING     Stretching by patient Seated hamstring  Incline board     CARDIOVASCULAR     Nu Step     Stationary Bike     Treadmill     Standing Ther-Ex Hip abduction  Hip extension       Supine Ther-Ex Bridge  Bent knee fall out    NEURO RE-ED  CPT 00739 TOTAL TIME FOR SESSION Not performed    FGA  Yes    TUG     Education     COORDINATION     POSTURAL RE-ED     PRE-GAIT ACTIVITIES      BALANCE TRAINING      Sitting balance     Standing balance - static FT, EO/EC  Semi tandem       Standing balance - dynamic Hurdles  Step taps   Step ups  Stepping stones      Standing balance - varied surface Airex     Rockerboard     GAIT TRAINING  CPT 90275 TOTAL TIME FOR SESSION Not performed    6 MWT, gait speed  Yes    Ambulation      Dynamic gait      Stair negotiation Assessed Yes    Curb negotiation     Ramp negotiation     Outdoor ambulation     BWS/vector training     MANUAL  CPT 21383 TOTAL TIME FOR SESSION Not performed    Stretching by therapist/PROM     Mobilization      MODALITIES  CPT 92794 TOTAL TIME FOR SESSION Not performed    Ice     Heat     ATTENDED E-STIM  CPT 80332 TOTAL TIME FOR SESSION Not performed    Attended E-Stim       ORTHOTICS TRAINING CPT 96384 TOTAL TIME FOR SESSION Not performed           GROUP  CPT 43530 TOTAL TIME FOR SESSION Not performed              ASSESSMENT:    This 69 y.o. year old female presents to PT with above stated diagnosis. Physical Therapy evaluation reveals decreased flexibility, impaired balance, impaired coordination, impaired sensation, pain, decreased strength, impaired sensory feedback resulting in community/leisure limitations. Africa Cooper will benefit from skilled PT services to address limitation, work towards rehab and patient goals and maximize PLOF of chosen ADLs.     Planned Services: The patient's treatment will include CPT 29552 Aquatic therapy/exercises,  CPT 74728 Gait training, CPT 88868 Neuromuscular Reeducation, CPT 77631 Self-care/Home management training, CPT 89431 Therapeutic exercises, CPT 76382 Wheelchair management, CPT 08315 Electrical stimulation ATTENDED, CPT 34905 Hot/Cold Packs therapy, CPT 01573 Electrical stimulation UNATTENDED, CPT 66594 Work conditioning, CPT 70097 Therapeutic activities, CPT 31042 Orthotics/Prosthetics management and training (Subsequent encounters), CPT 24123 Orthotics training (Initial encounter), CPT 44279 Manual therapy, CPT 13364 Canalith repositioning procedure/maneuvers,Vector BWSS.     Pauline Christine, PT

## 2025-07-30 ENCOUNTER — HOSPITAL ENCOUNTER (OUTPATIENT)
Dept: PHYSICAL THERAPY | Facility: REHABILITATION | Age: 70
Setting detail: THERAPIES SERIES
Discharge: HOME | End: 2025-07-30
Attending: PSYCHIATRY & NEUROLOGY
Payer: MEDICARE

## 2025-07-30 DIAGNOSIS — R20.0 TACTILE ANESTHESIA: ICD-10-CM

## 2025-07-30 DIAGNOSIS — G35 MULTIPLE SCLEROSIS (CMS/HCC): Primary | ICD-10-CM

## 2025-07-30 DIAGNOSIS — R26.89 OTHER ABNORMALITIES OF GAIT AND MOBILITY: ICD-10-CM

## 2025-07-30 DIAGNOSIS — R20.9 DISTURBANCE OF SKIN SENSATION: ICD-10-CM

## 2025-07-30 DIAGNOSIS — R20.2 PARESTHESIA: ICD-10-CM

## 2025-07-30 DIAGNOSIS — R53.1 WEAKNESS: ICD-10-CM

## 2025-07-30 PROCEDURE — 97110 THERAPEUTIC EXERCISES: CPT | Mod: GP

## 2025-07-30 PROCEDURE — 97112 NEUROMUSCULAR REEDUCATION: CPT | Mod: GP

## 2025-07-30 NOTE — OP PT TREATMENT LOG
"PT Neuro Exercises Current Session Time    THER ACT   CPT 56557 TOTAL TIME FOR SESSION 0-7 min    Vitals/subj/pain  Yes    Assessments     HEP     Orthotic Nabosos trialed during eval, edu on benefits of use    Yes    Bed mobility Assessed     Transfer training Assessed    Education Educated regarding impairments and PT POC moving forward. Patient provided with Welcome Letter, which includes attendance policy. Provided education regarding cancellation and no-show policy. Education regarding the importance of participation and regular attendance to maximize goal attainment.       THER EX  CPT 83704 TOTAL TIME FOR SESSION 38-53 minutes     Education HEP updated, see media for details. Pt educated on form and frequency for each. Pt verbalized understanding to all education provided.    Yes    STRETCHING     Stretching by patient Seated hamstring 2 x 30 sec   Incline board L3 x 1 min  LTR 5 x 5 sec hold  Physioball roll out 5x ea direction, 5 second hold   Standing gastroc stretch 2 x 30 seconds Yes     Yes   Yes   Yes    CARDIOVASCULAR     Nu Step     Stationary Bike     Treadmill     Seated TE Hamstring curl, GTB Yes    Standing Ther-Ex Hip abduction  Hip extension       Supine Ther-Ex PPT x 10  PPT w ball squeeze and bridge x 10   - As above w LAQ at the top x 10   Clamshells BTB, 5 x 10 sec hold (hips extended, knees bent)   S/L hip abduction x 10    Yes   Yes   Yes   Yes     Yes    NEURO RE-ED  CPT 33473 TOTAL TIME FOR SESSION 8-22 min    FGA     TUG     Education     COORDINATION     POSTURAL RE-ED     PRE-GAIT ACTIVITIES      BALANCE TRAINING      Sitting balance     Standing balance - static FT, EO/EC  Semi tandem       Standing balance - dynamic Hurdles  Step taps   Step ups to 6\" block from airex x 8 ea LE  Stepping stones       Yes    Standing balance - varied surface Airex   - FT, EO/EC  - FT, HT (induced dizziness, terminated early)    Rockerboard    Yes   Yes    GAIT TRAINING  CPT 50780 TOTAL TIME FOR " SESSION Not performed    6 MWT, gait speed  Yes    Ambulation      Dynamic gait      Stair negotiation Assessed Yes    Curb negotiation     Ramp negotiation     Outdoor ambulation     BWS/vector training     MANUAL  CPT 99417 TOTAL TIME FOR SESSION Not performed    Stretching by therapist/PROM     Mobilization      MODALITIES  CPT 68416 TOTAL TIME FOR SESSION Not performed    Ice     Heat     ATTENDED E-STIM  CPT 91372 TOTAL TIME FOR SESSION Not performed    Attended E-Stim       ORTHOTICS TRAINING CPT 42361 TOTAL TIME FOR SESSION Not performed           GROUP  CPT 81461 TOTAL TIME FOR SESSION Not performed

## 2025-07-30 NOTE — PROGRESS NOTES
Physical Therapy Visit    PT DAILY NOTE FOR OUTPATIENT THERAPY    Patient: Africa Cooper MRN: [id number]  : 1955 69 y.o.  Referring Physician: Mae Fontaine MD  Date of Visit: 2025    Certification Dates: 25 through 25     Diagnosis:   1. Multiple sclerosis (CMS/HCC)    2. Weakness    3. Tactile anesthesia    4. Paresthesia    5. Disturbance of skin sensation    6. Other abnormalities of gait and mobility        Chief Complaints:  gait/balance    Precautions:          TODAY'S VISIT    Time In Session:  Start Time: 801  Stop Time: 855  Time Calculation (min): 54 min   History/Vitals/Pain/Encounter Info - 25 0803          Injury History/Precautions/Daily Required Info    Document Type daily treatment     Primary Therapist Pauline Emmett     Chief Complaint/Reason for Visit  gait/balance     Referring Physician Mae Fontaine MD     History of present illness/functional impairment Patient is a 69 y.o. with a PMH of MS, Cervical spondylosis, scoliosis, Meniere's disease, Diverticulosis, DJD (c/s and t/s). She reports participating in Aquatic exercise and works out with a . She recently had a fall, requiring stiches on her chin from when she lost her footing and fell forward. She also reports a few falls prior to that. She is unsure what caused the falls but does report increased numbness in her feet.     Patient/Family/Caregiver Comments/Observations No new changes since last visit     Patient reported fall since last visit No        Pain Assessment    Currently in pain Yes     Preferred Pain Scale number (Numeric Rating Pain Scale)     Pain Side/Orientation right     Pain: Body location Back     Pain Rating (0-10): Pre Activity 4        Pain Intervention    Intervention  monitored throughout session     Post Intervention Comments no inc        Pre Activity Vital Signs    /60     BP Location Left upper arm     BP Method Manual     Patient Position Sitting                Daily Treatment Assessment and Plan - 07/30/25 0803          Daily Treatment Assessment and Plan    Progress toward goals Progressing     Daily Outcome Summary Pt tolerated first session following IE well. HEP provided this session with stretches and supine/seated strengthening exercises. Pt edu on form/frequency for ea. Following, pt challenged w balance on a compliant surface, but did demo good ankle/hip strategies.     Plan and Recommendations Cont with Naboso trials, high level balance/coordination, uneven surfaces, Slip ?                     OBJECTIVE DATA TAKEN TODAY:    None taken    Today's Treatment:    PT Neuro Exercises Current Session Time    THER ACT   CPT 99134 TOTAL TIME FOR SESSION 0-7 min    Vitals/subj/pain  Yes    Assessments     HEP     Orthotic Nabosos trialed during eval, edu on benefits of use    Yes    Bed mobility Assessed     Transfer training Assessed    Education Educated regarding impairments and PT POC moving forward. Patient provided with Welcome Letter, which includes attendance policy. Provided education regarding cancellation and no-show policy. Education regarding the importance of participation and regular attendance to maximize goal attainment.       THER EX  CPT 56682 TOTAL TIME FOR SESSION 38-53 minutes     Education HEP updated, see media for details. Pt educated on form and frequency for each. Pt verbalized understanding to all education provided.    Yes    STRETCHING     Stretching by patient Seated hamstring 2 x 30 sec   Incline board L3 x 1 min  LTR 5 x 5 sec hold  Physioball roll out 5x ea direction, 5 second hold   Standing gastroc stretch 2 x 30 seconds Yes     Yes   Yes   Yes    CARDIOVASCULAR     Nu Step     Stationary Bike     Treadmill     Seated TE Hamstring curl, GTB Yes    Standing Ther-Ex Hip abduction  Hip extension       Supine Ther-Ex PPT x 10  PPT w ball squeeze and bridge x 10   - As above w LAQ at the top x 10   Clamshells BTB, 5 x 10 sec hold (hips  "extended, knees bent)   S/L hip abduction x 10    Yes   Yes   Yes   Yes     Yes    NEURO RE-ED  CPT 78869 TOTAL TIME FOR SESSION 8-22 min    FGA     TUG     Education     COORDINATION     POSTURAL RE-ED     PRE-GAIT ACTIVITIES      BALANCE TRAINING      Sitting balance     Standing balance - static FT, EO/EC  Semi tandem       Standing balance - dynamic Hurdles  Step taps   Step ups to 6\" block from airex x 8 ea LE  Stepping stones       Yes    Standing balance - varied surface Airex   - FT, EO/EC  - FT, HT (induced dizziness, terminated early)    Rockerboard    Yes   Yes    GAIT TRAINING  CPT 69049 TOTAL TIME FOR SESSION Not performed    6 MWT, gait speed  Yes    Ambulation      Dynamic gait      Stair negotiation Assessed Yes    Curb negotiation     Ramp negotiation     Outdoor ambulation     BWS/vector training     MANUAL  CPT 75601 TOTAL TIME FOR SESSION Not performed    Stretching by therapist/PROM     Mobilization      MODALITIES  CPT 72302 TOTAL TIME FOR SESSION Not performed    Ice     Heat     ATTENDED E-STIM  CPT 95996 TOTAL TIME FOR SESSION Not performed    Attended E-Stim       ORTHOTICS TRAINING CPT 23875 TOTAL TIME FOR SESSION Not performed           GROUP  CPT 92694 TOTAL TIME FOR SESSION Not performed                                   "

## 2025-08-06 ENCOUNTER — HOSPITAL ENCOUNTER (OUTPATIENT)
Dept: PHYSICAL THERAPY | Facility: REHABILITATION | Age: 70
Setting detail: THERAPIES SERIES
Discharge: HOME | End: 2025-08-06
Attending: PSYCHIATRY & NEUROLOGY
Payer: MEDICARE

## 2025-08-06 DIAGNOSIS — R53.1 WEAKNESS: ICD-10-CM

## 2025-08-06 DIAGNOSIS — R20.0 TACTILE ANESTHESIA: ICD-10-CM

## 2025-08-06 DIAGNOSIS — R26.89 OTHER ABNORMALITIES OF GAIT AND MOBILITY: ICD-10-CM

## 2025-08-06 DIAGNOSIS — G35 MULTIPLE SCLEROSIS (CMS/HCC): Primary | ICD-10-CM

## 2025-08-06 DIAGNOSIS — R20.9 DISTURBANCE OF SKIN SENSATION: ICD-10-CM

## 2025-08-06 DIAGNOSIS — R20.2 PARESTHESIA: ICD-10-CM

## 2025-08-06 PROCEDURE — 97530 THERAPEUTIC ACTIVITIES: CPT | Mod: GP

## 2025-08-06 PROCEDURE — 97110 THERAPEUTIC EXERCISES: CPT | Mod: GP

## 2025-08-06 NOTE — PROGRESS NOTES
Physical Therapy Visit    PT DAILY NOTE FOR OUTPATIENT THERAPY    Patient: Africa Cooper MRN: [id number]  : 1955 69 y.o.  Referring Physician: Mae Fontaine MD  Date of Visit: 2025    Certification Dates: 25 through 25     Diagnosis:   1. Multiple sclerosis (CMS/HCC)    2. Weakness    3. Tactile anesthesia    4. Paresthesia    5. Disturbance of skin sensation    6. Other abnormalities of gait and mobility        Chief Complaints:  gait/balance    Precautions:          TODAY'S VISIT    Time In Session:  Start Time: 815  Stop Time: 858  Time Calculation (min): 43 min   History/Vitals/Pain/Encounter Info - 25 0816          Injury History/Precautions/Daily Required Info    Document Type daily treatment     Primary Therapist Pauline Emmett     Chief Complaint/Reason for Visit  gait/balance     Referring Physician Mae Fontaine MD     History of present illness/functional impairment Patient is a 69 y.o. with a PMH of MS, Cervical spondylosis, scoliosis, Meniere's disease, Diverticulosis, DJD (c/s and t/s). She reports participating in Aquatic exercise and works out with a . She recently had a fall, requiring stiches on her chin from when she lost her footing and fell forward. She also reports a few falls prior to that. She is unsure what caused the falls but does report increased numbness in her feet.     Patient/Family/Caregiver Comments/Observations Arrives 15 min late to session, no new changes otherwise.     Patient reported fall since last visit No        Pain Assessment    Currently in pain No/Denies        Pre Activity Vital Signs    /60     BP Location Left upper arm     BP Method Manual     Patient Position Sitting               Daily Treatment Assessment and Plan - 25 0816          Daily Treatment Assessment and Plan    Progress toward goals Progressing     Daily Outcome Summary Pt tolerated session well. Focused on dynamic balance activities with Nabosos  donned for additional proprioceptive input. No significant LOB noted with activities but pt was challenged with the additional of an airex - good ankle strategies noted throughout. 3 units billed due to late arrival     Plan and Recommendations Cont with Naboso trials, high level balance/coordination, uneven surfaces, Slip ?                     OBJECTIVE DATA TAKEN TODAY:    None taken    Today's Treatment:    PT Neuro Exercises Current Session Time    THER ACT   CPT 52547 TOTAL TIME FOR SESSION 0-7 min    Vitals/subj/pain  Yes    Assessments     HEP     Orthotic Nabosos trial Yes    Bed mobility Assessed     Transfer training Assessed    Education Educated regarding impairments and PT POC moving forward. Patient provided with Welcome Letter, which includes attendance policy. Provided education regarding cancellation and no-show policy. Education regarding the importance of participation and regular attendance to maximize goal attainment.       THER EX  CPT 56213 TOTAL TIME FOR SESSION 8-22 minutes     Education HEP updated, see media for details. Pt educated on form and frequency for each. Pt verbalized understanding to all education provided.       STRETCHING     Stretching by patient Seated hamstring 2 x 30 sec   Incline board L3 x 1 min  LTR 5 x 5 sec hold  Physioball roll out 5x ea direction, 5 second hold   Standing gastroc stretch 2 x 30 seconds Yes     Yes   Yes   Yes    CARDIOVASCULAR     Nu Step     Stationary Bike     Treadmill     Seated TE Hamstring curl, GTB    Standing Ther-Ex Hip abduction  Hip extension       Supine Ther-Ex PPT x 10  PPT w ball squeeze and bridge x 10   - As above w LAQ at the top x 10   Clamshells BTB, 5 x 10 sec hold (hips extended, knees bent)   S/L hip abduction x 10       NEURO RE-ED  CPT 61695 TOTAL TIME FOR SESSION 23-37 min    FGA     TUG     Education     COORDINATION     POSTURAL RE-ED     PRE-GAIT ACTIVITIES      BALANCE TRAINING      Sitting balance     Standing  "balance - static FT, EO/EC  Semi tandem       Standing balance - dynamic Hurdles 6\"  - Reciprocal pattern  - Stand from chair, navigate through reciprocal, then completed 180 degree turn and complete back to chair  - Then side stepping, no airex then with airex x 2 trials ea    Step taps   Step ups to 6\" block from airex x 8 ea LE  Stepping stones    Outdoor ambulation, w Naboso donned, across grass, inclines/declines   Yes                    Yes     Standing balance - varied surface Airex   - FT, EO/EC  - FT, HT   - Semi tandem, EO/EC 2 x 30 seconds    Rockerboard    Yes     Yes    GAIT TRAINING  CPT 30347 TOTAL TIME FOR SESSION Not performed    6 MWT, gait speed     Ambulation      Dynamic gait      Stair negotiation Assessed    Curb negotiation     Ramp negotiation     Outdoor ambulation     BWS/vector training     MANUAL  CPT 85006 TOTAL TIME FOR SESSION Not performed    Stretching by therapist/PROM     Mobilization      MODALITIES  CPT 06844 TOTAL TIME FOR SESSION Not performed    Ice     Heat     ATTENDED E-STIM  CPT 02774 TOTAL TIME FOR SESSION Not performed    Attended E-Stim       ORTHOTICS TRAINING CPT 79056 TOTAL TIME FOR SESSION Not performed           GROUP  CPT 98259 TOTAL TIME FOR SESSION Not performed                                   "

## 2025-08-06 NOTE — OP PT TREATMENT LOG
"PT Neuro Exercises Current Session Time    THER ACT   CPT 29798 TOTAL TIME FOR SESSION 0-7 min    Vitals/subj/pain  Yes    Assessments     HEP     Orthotic Nabosos trial Yes    Bed mobility Assessed     Transfer training Assessed    Education Educated regarding impairments and PT POC moving forward. Patient provided with Welcome Letter, which includes attendance policy. Provided education regarding cancellation and no-show policy. Education regarding the importance of participation and regular attendance to maximize goal attainment.       THER EX  CPT 06042 TOTAL TIME FOR SESSION 8-22 minutes     Education HEP updated, see media for details. Pt educated on form and frequency for each. Pt verbalized understanding to all education provided.       STRETCHING     Stretching by patient Seated hamstring 2 x 30 sec   Incline board L3 x 1 min  LTR 5 x 5 sec hold  Physioball roll out 5x ea direction, 5 second hold   Standing gastroc stretch 2 x 30 seconds Yes     Yes   Yes   Yes    CARDIOVASCULAR     Nu Step     Stationary Bike     Treadmill     Seated TE Hamstring curl, GTB    Standing Ther-Ex Hip abduction  Hip extension       Supine Ther-Ex PPT x 10  PPT w ball squeeze and bridge x 10   - As above w LAQ at the top x 10   Clamshells BTB, 5 x 10 sec hold (hips extended, knees bent)   S/L hip abduction x 10       NEURO RE-ED  CPT 24892 TOTAL TIME FOR SESSION 23-37 min    FGA     TUG     Education     COORDINATION     POSTURAL RE-ED     PRE-GAIT ACTIVITIES      BALANCE TRAINING      Sitting balance     Standing balance - static FT, EO/EC  Semi tandem       Standing balance - dynamic Hurdles 6\"  - Reciprocal pattern  - Stand from chair, navigate through reciprocal, then completed 180 degree turn and complete back to chair  - Then side stepping, no airex then with airex x 2 trials ea    Step taps   Step ups to 6\" block from airex x 8 ea LE  Stepping stones    Outdoor ambulation, w Omkar knott, across grass, " inclines/declines   Yes                    Yes     Standing balance - varied surface Airex   - FT, EO/EC  - FT, HT   - Semi tandem, EO/EC 2 x 30 seconds    Rockerboard    Yes     Yes    GAIT TRAINING  CPT 69214 TOTAL TIME FOR SESSION Not performed    6 MWT, gait speed     Ambulation      Dynamic gait      Stair negotiation Assessed    Curb negotiation     Ramp negotiation     Outdoor ambulation     BWS/vector training     MANUAL  CPT 22039 TOTAL TIME FOR SESSION Not performed    Stretching by therapist/PROM     Mobilization      MODALITIES  CPT 51276 TOTAL TIME FOR SESSION Not performed    Ice     Heat     ATTENDED E-STIM  CPT 60652 TOTAL TIME FOR SESSION Not performed    Attended E-Stim       ORTHOTICS TRAINING CPT 81099 TOTAL TIME FOR SESSION Not performed           GROUP  CPT 74813 TOTAL TIME FOR SESSION Not performed

## 2025-08-08 ENCOUNTER — HOSPITAL ENCOUNTER (OUTPATIENT)
Dept: PHYSICAL THERAPY | Facility: REHABILITATION | Age: 70
Setting detail: THERAPIES SERIES
Discharge: HOME | End: 2025-08-08
Attending: PSYCHIATRY & NEUROLOGY
Payer: MEDICARE

## 2025-08-08 DIAGNOSIS — R26.89 OTHER ABNORMALITIES OF GAIT AND MOBILITY: ICD-10-CM

## 2025-08-08 DIAGNOSIS — R53.1 WEAKNESS: ICD-10-CM

## 2025-08-08 DIAGNOSIS — R20.9 DISTURBANCE OF SKIN SENSATION: ICD-10-CM

## 2025-08-08 DIAGNOSIS — R20.0 TACTILE ANESTHESIA: ICD-10-CM

## 2025-08-08 DIAGNOSIS — G35 MULTIPLE SCLEROSIS (CMS/HCC): Primary | ICD-10-CM

## 2025-08-08 DIAGNOSIS — R20.2 PARESTHESIA: ICD-10-CM

## 2025-08-08 PROCEDURE — 97112 NEUROMUSCULAR REEDUCATION: CPT | Mod: GP

## 2025-08-08 PROCEDURE — 97110 THERAPEUTIC EXERCISES: CPT | Mod: GP

## 2025-08-08 NOTE — PROGRESS NOTES
Physical Therapy Visit    PT DAILY NOTE FOR OUTPATIENT THERAPY    Patient: Africa Cooper MRN: [id number]  : 1955 69 y.o.  Referring Physician: Mae Fontaine MD  Date of Visit: 2025    Certification Dates: 25 through 25     Diagnosis:   1. Multiple sclerosis (CMS/HCC)    2. Weakness    3. Tactile anesthesia    4. Paresthesia    5. Disturbance of skin sensation    6. Other abnormalities of gait and mobility        Chief Complaints:  gait/balance    Precautions:          TODAY'S VISIT    Time In Session:  Start Time: 1404  Stop Time: 1500  Time Calculation (min): 56 min   History/Vitals/Pain/Encounter Info - 25 1434          Injury History/Precautions/Daily Required Info    Document Type daily treatment     Primary Therapist Pauline Christine     Chief Complaint/Reason for Visit  gait/balance     Referring Physician Mae Fontaine MD     History of present illness/functional impairment Patient is a 69 y.o. with a PMH of MS, Cervical spondylosis, scoliosis, Meniere's disease, Diverticulosis, DJD (c/s and t/s). She reports participating in Aquatic exercise and works out with a . She recently had a fall, requiring stiches on her chin from when she lost her footing and fell forward. She also reports a few falls prior to that. She is unsure what caused the falls but does report increased numbness in her feet.     Patient/Family/Caregiver Comments/Observations Pt reports her back pain is about the same it has always been nothing terrible.     Patient reported fall since last visit No        Pain Assessment    Currently in pain Yes     Pain Rating (0-10): Pre Activity 2        Pain Intervention    Intervention  postural re-education, TE     Post Intervention Comments No change               Daily Treatment Assessment and Plan - 25 1434          Daily Treatment Assessment and Plan    Progress toward goals Progressing     Daily Outcome Summary Pt tolerated assessment of her  scoliosis with pt able to make moderate corrections with R side glides against wall today with report of mild soreness in the central low back and lateral L hip. Focused strengthening on L hip abduction and core stabilization followed by stretches for the hips and trunk. Ended session with standing exercises focused on maintaining R lateral shift at pelvis and neutral shoulders using manual cues and mirror feedback. Layered this with step taps and lateral step outs with pt report of notable challenge but moderate improvements in her posture - minimal carry over to ambulation post and pt would benefit from continued strengthening and postural re-ed for long term benefit.     Plan and Recommendations Cont with Naboso trials, high level balance/coordination, uneven surfaces, Slip ? L hip abd strength, R side glide trials - pt educated to bring old HEP to consolidate                     OBJECTIVE DATA TAKEN TODAY:    None taken    Today's Treatment:    PT Neuro Exercises Current Session Time    THER ACT   CPT 73275 TOTAL TIME FOR SESSION 0-7 min    Vitals/subj/pain  Yes    Postural assessment Scoliosis - appears to be at least partially functional with pt able to correct w R slide glides against wall     Leg length: 1/2 cm discrepancy ASIS to medial malleolus, so discussed likelihood of functional LLD vs anatomical Yes      yes   HEP     Orthotic Nabosos trial Yes    Bed mobility Assessed     Transfer training Assessed    Education Educated regarding impairments and PT POC moving forward. Patient provided with Welcome Letter, which includes attendance policy. Provided education regarding cancellation and no-show policy. Education regarding the importance of participation and regular attendance to maximize goal attainment.       THER EX  CPT 02385 TOTAL TIME FOR SESSION 38 minutes     Education HEP updated, see media for details. Pt educated on form and frequency for each. Pt verbalized understanding to all  "education provided.       STRETCHING     Stretching by patient supine hamstring 2 x 30 sec   Incline board L3 x 1 min  LTR 5 x 5 sec hold  Figure 4 [id number]  Figure 4 LTR x5 w RLE crossed  R trunk, L sidelying w Les off EOM in 90/90  Open book 1 x 5 ea  Physioball roll out 5x ea direction, 5 second hold   Standing gastroc stretch 2 x 30 seconds Yes     Yes   Yes   Yes   Yes  yes   CARDIOVASCULAR     Nu Step     Stationary Bike     Treadmill     Seated TE Hamstring curl, GTB    Standing Ther-Ex     Supine Ther-Ex L S/L hip abduction 2 x 10   TAC + SLR 1 x 10  TAC + SLR  + shoulder flexion 6# MB 1  x10   Yes  Yes  yes   NEURO RE-ED  CPT 71284 TOTAL TIME FOR SESSION 8-22 min    FGA     TUG     Education     COORDINATION     POSTURAL RE-ED Mirror feedback:  Manual cues at R rib cage and L pelvis for correction of curvature:    Maintain posture above with :  6\" step taps, R x 10, B 1 x 10 ea  Lateral step out blue TB 1 x 10 ea Yes (all)        Yes (all)   PRE-GAIT ACTIVITIES      BALANCE TRAINING      Sitting balance     Standing balance - static FA  Semi tandem       Standing balance - dynamic Hurdles 6\"  - Reciprocal pattern  - Stand from chair, navigate through reciprocal, then completed 180 degree turn and complete back to chair  - Then side stepping, no airex then with airex x 2 trials ea    Step taps   Step ups to 6\" block from airex x 8 ea LE  Stepping stones    Outdoor ambulation, w Naboso donned, across grass, inclines/declines      Standing balance - varied surface Airex   - FT, EO/EC  - FT, HT   - Semi tandem, EO/EC 2 x 30 seconds    Rockerboard     GAIT TRAINING  CPT 81771 TOTAL TIME FOR SESSION Not performed    6 MWT, gait speed     Ambulation      Dynamic gait      Stair negotiation Assessed    Curb negotiation     Ramp negotiation     Outdoor ambulation     BWS/vector training     MANUAL  CPT 32232 TOTAL TIME FOR SESSION Not performed    Stretching by therapist/PROM     Mobilization      MODALITIES  CPT " 33172 TOTAL TIME FOR SESSION Not performed    Ice     Heat     ATTENDED E-STIM  CPT 91057 TOTAL TIME FOR SESSION Not performed    Attended E-Stim       ORTHOTICS TRAINING CPT 55040 TOTAL TIME FOR SESSION Not performed           GROUP  CPT 10546 TOTAL TIME FOR SESSION Not performed

## 2025-08-08 NOTE — OP PT TREATMENT LOG
"PT Neuro Exercises Current Session Time    THER ACT   CPT 35751 TOTAL TIME FOR SESSION 0-7 min    Vitals/subj/pain  Yes    Postural assessment Scoliosis - appears to be at least partially functional with pt able to correct w R slide glides against wall     Leg length: 1/2 cm discrepancy ASIS to medial malleolus, so discussed likelihood of functional LLD vs anatomical Yes      yes   HEP     Orthotic Nabosos trial Yes    Bed mobility Assessed     Transfer training Assessed    Education Educated regarding impairments and PT POC moving forward. Patient provided with Welcome Letter, which includes attendance policy. Provided education regarding cancellation and no-show policy. Education regarding the importance of participation and regular attendance to maximize goal attainment.       THER EX  CPT 97534 TOTAL TIME FOR SESSION 38 minutes     Education HEP updated, see media for details. Pt educated on form and frequency for each. Pt verbalized understanding to all education provided.       STRETCHING     Stretching by patient supine hamstring 2 x 30 sec   Incline board L3 x 1 min  LTR 5 x 5 sec hold  Figure 4 [id number]  Figure 4 LTR x5 w RLE crossed  R trunk, L sidelying w Les off EOM in 90/90  Open book 1 x 5 ea  Physioball roll out 5x ea direction, 5 second hold   Standing gastroc stretch 2 x 30 seconds Yes     Yes   Yes   Yes   Yes  yes   CARDIOVASCULAR     Nu Step     Stationary Bike     Treadmill     Seated TE Hamstring curl, GTB    Standing Ther-Ex     Supine Ther-Ex L S/L hip abduction 2 x 10   TAC + SLR 1 x 10  TAC + SLR  + shoulder flexion 6# MB 1  x10   Yes  Yes  yes   NEURO RE-ED  CPT 82453 TOTAL TIME FOR SESSION 8-22 min    FGA     TUG     Education     COORDINATION     POSTURAL RE-ED Mirror feedback:  Manual cues at R rib cage and L pelvis for correction of curvature:    Maintain posture above with :  6\" step taps, R x 10, B 1 x 10 ea  Lateral step out blue TB 1 x 10 ea Yes (all)        Yes (all)   PRE-GAIT " "ACTIVITIES      BALANCE TRAINING      Sitting balance     Standing balance - static FA  Semi tandem       Standing balance - dynamic Hurdles 6\"  - Reciprocal pattern  - Stand from chair, navigate through reciprocal, then completed 180 degree turn and complete back to chair  - Then side stepping, no airex then with airex x 2 trials ea    Step taps   Step ups to 6\" block from airex x 8 ea LE  Stepping stones    Outdoor ambulation, w Naboso donned, across grass, inclines/declines      Standing balance - varied surface Airex   - FT, EO/EC  - FT, HT   - Semi tandem, EO/EC 2 x 30 seconds    Rockerboard     GAIT TRAINING  CPT 69107 TOTAL TIME FOR SESSION Not performed    6 MWT, gait speed     Ambulation      Dynamic gait      Stair negotiation Assessed    Curb negotiation     Ramp negotiation     Outdoor ambulation     BWS/vector training     MANUAL  CPT 32931 TOTAL TIME FOR SESSION Not performed    Stretching by therapist/PROM     Mobilization      MODALITIES  CPT 24693 TOTAL TIME FOR SESSION Not performed    Ice     Heat     ATTENDED E-STIM  CPT 71691 TOTAL TIME FOR SESSION Not performed    Attended E-Stim       ORTHOTICS TRAINING CPT 44374 TOTAL TIME FOR SESSION Not performed           GROUP  CPT 13250 TOTAL TIME FOR SESSION Not performed            "

## 2025-08-11 ENCOUNTER — HOSPITAL ENCOUNTER (OUTPATIENT)
Dept: PHYSICAL THERAPY | Facility: REHABILITATION | Age: 70
Setting detail: THERAPIES SERIES
Discharge: HOME | End: 2025-08-11
Attending: PSYCHIATRY & NEUROLOGY
Payer: MEDICARE

## 2025-08-11 DIAGNOSIS — R20.2 PARESTHESIA: ICD-10-CM

## 2025-08-11 DIAGNOSIS — G35 MULTIPLE SCLEROSIS (CMS/HCC): Primary | ICD-10-CM

## 2025-08-11 DIAGNOSIS — R20.0 TACTILE ANESTHESIA: ICD-10-CM

## 2025-08-11 DIAGNOSIS — R53.1 WEAKNESS: ICD-10-CM

## 2025-08-11 DIAGNOSIS — R20.9 DISTURBANCE OF SKIN SENSATION: ICD-10-CM

## 2025-08-11 DIAGNOSIS — R26.89 OTHER ABNORMALITIES OF GAIT AND MOBILITY: ICD-10-CM

## 2025-08-11 PROCEDURE — 97112 NEUROMUSCULAR REEDUCATION: CPT | Mod: GP

## 2025-08-11 PROCEDURE — 97110 THERAPEUTIC EXERCISES: CPT | Mod: GP

## 2025-08-11 PROCEDURE — 97530 THERAPEUTIC ACTIVITIES: CPT | Mod: GP

## 2025-08-13 ENCOUNTER — HOSPITAL ENCOUNTER (OUTPATIENT)
Dept: PHYSICAL THERAPY | Facility: REHABILITATION | Age: 70
Setting detail: THERAPIES SERIES
Discharge: HOME | End: 2025-08-13
Attending: PSYCHIATRY & NEUROLOGY
Payer: MEDICARE

## 2025-08-13 DIAGNOSIS — R53.1 WEAKNESS: ICD-10-CM

## 2025-08-13 DIAGNOSIS — R26.89 OTHER ABNORMALITIES OF GAIT AND MOBILITY: ICD-10-CM

## 2025-08-13 DIAGNOSIS — R20.0 TACTILE ANESTHESIA: ICD-10-CM

## 2025-08-13 DIAGNOSIS — R20.2 PARESTHESIA: ICD-10-CM

## 2025-08-13 DIAGNOSIS — G35 MULTIPLE SCLEROSIS (CMS/HCC): Primary | ICD-10-CM

## 2025-08-13 PROCEDURE — 97110 THERAPEUTIC EXERCISES: CPT | Mod: GP

## 2025-08-13 PROCEDURE — 97112 NEUROMUSCULAR REEDUCATION: CPT | Mod: GP

## 2025-08-13 PROCEDURE — 97530 THERAPEUTIC ACTIVITIES: CPT | Mod: GP

## 2025-08-18 ENCOUNTER — HOSPITAL ENCOUNTER (OUTPATIENT)
Dept: PHYSICAL THERAPY | Facility: REHABILITATION | Age: 70
Setting detail: THERAPIES SERIES
Discharge: HOME | End: 2025-08-18
Attending: PSYCHIATRY & NEUROLOGY
Payer: MEDICARE

## 2025-08-18 DIAGNOSIS — G35 MULTIPLE SCLEROSIS (CMS/HCC): Primary | ICD-10-CM

## 2025-08-18 DIAGNOSIS — R20.0 TACTILE ANESTHESIA: ICD-10-CM

## 2025-08-18 DIAGNOSIS — R53.1 WEAKNESS: ICD-10-CM

## 2025-08-18 DIAGNOSIS — R20.2 PARESTHESIA: ICD-10-CM

## 2025-08-18 DIAGNOSIS — R20.9 DISTURBANCE OF SKIN SENSATION: ICD-10-CM

## 2025-08-18 DIAGNOSIS — R26.89 OTHER ABNORMALITIES OF GAIT AND MOBILITY: ICD-10-CM

## 2025-08-18 PROCEDURE — 97112 NEUROMUSCULAR REEDUCATION: CPT | Mod: GP

## 2025-08-18 PROCEDURE — 97530 THERAPEUTIC ACTIVITIES: CPT | Mod: GP

## 2025-08-18 PROCEDURE — 97110 THERAPEUTIC EXERCISES: CPT | Mod: GP

## 2025-08-20 ENCOUNTER — HOSPITAL ENCOUNTER (OUTPATIENT)
Dept: PHYSICAL THERAPY | Facility: REHABILITATION | Age: 70
Setting detail: THERAPIES SERIES
Discharge: HOME | End: 2025-08-20
Attending: PSYCHIATRY & NEUROLOGY
Payer: MEDICARE

## 2025-08-20 DIAGNOSIS — R20.2 PARESTHESIA: ICD-10-CM

## 2025-08-20 DIAGNOSIS — R26.89 OTHER ABNORMALITIES OF GAIT AND MOBILITY: ICD-10-CM

## 2025-08-20 DIAGNOSIS — R20.9 DISTURBANCE OF SKIN SENSATION: ICD-10-CM

## 2025-08-20 DIAGNOSIS — G35 MULTIPLE SCLEROSIS (CMS/HCC): Primary | ICD-10-CM

## 2025-08-20 DIAGNOSIS — R53.1 WEAKNESS: ICD-10-CM

## 2025-08-20 DIAGNOSIS — R20.0 TACTILE ANESTHESIA: ICD-10-CM

## 2025-08-20 PROCEDURE — 97112 NEUROMUSCULAR REEDUCATION: CPT | Mod: GP

## 2025-08-20 PROCEDURE — 97110 THERAPEUTIC EXERCISES: CPT | Mod: GP

## 2025-08-25 ENCOUNTER — HOSPITAL ENCOUNTER (OUTPATIENT)
Dept: PHYSICAL THERAPY | Facility: REHABILITATION | Age: 70
Setting detail: THERAPIES SERIES
Discharge: HOME | End: 2025-08-25
Attending: PSYCHIATRY & NEUROLOGY
Payer: MEDICARE

## 2025-08-25 DIAGNOSIS — R20.0 TACTILE ANESTHESIA: ICD-10-CM

## 2025-08-25 DIAGNOSIS — R53.1 WEAKNESS: ICD-10-CM

## 2025-08-25 DIAGNOSIS — R20.9 DISTURBANCE OF SKIN SENSATION: ICD-10-CM

## 2025-08-25 DIAGNOSIS — R26.89 OTHER ABNORMALITIES OF GAIT AND MOBILITY: ICD-10-CM

## 2025-08-25 DIAGNOSIS — G35 MULTIPLE SCLEROSIS (CMS/HCC): Primary | ICD-10-CM

## 2025-08-25 DIAGNOSIS — R20.2 PARESTHESIA: ICD-10-CM

## 2025-08-25 PROCEDURE — 97110 THERAPEUTIC EXERCISES: CPT | Mod: GP

## 2025-08-25 PROCEDURE — 97112 NEUROMUSCULAR REEDUCATION: CPT | Mod: GP

## 2025-08-29 ENCOUNTER — HOSPITAL ENCOUNTER (OUTPATIENT)
Dept: PHYSICAL THERAPY | Facility: REHABILITATION | Age: 70
Setting detail: THERAPIES SERIES
Discharge: HOME | End: 2025-08-29
Attending: PSYCHIATRY & NEUROLOGY
Payer: MEDICARE

## 2025-09-03 ENCOUNTER — HOSPITAL ENCOUNTER (OUTPATIENT)
Dept: PHYSICAL THERAPY | Facility: REHABILITATION | Age: 70
Setting detail: THERAPIES SERIES
Discharge: HOME | End: 2025-09-03
Attending: PSYCHIATRY & NEUROLOGY
Payer: MEDICARE

## 2025-09-03 DIAGNOSIS — M51.360 LUMBAR DISCOGENIC PAIN SYNDROME: Primary | ICD-10-CM

## 2025-09-03 DIAGNOSIS — R53.1 WEAKNESS: ICD-10-CM

## 2025-09-03 DIAGNOSIS — R20.2 PARESTHESIA: ICD-10-CM

## 2025-09-03 DIAGNOSIS — R20.0 TACTILE ANESTHESIA: ICD-10-CM

## 2025-09-03 DIAGNOSIS — G35 MULTIPLE SCLEROSIS (CMS/HCC): ICD-10-CM

## 2025-09-03 DIAGNOSIS — R26.89 OTHER ABNORMALITIES OF GAIT AND MOBILITY: ICD-10-CM

## 2025-09-03 PROCEDURE — 97112 NEUROMUSCULAR REEDUCATION: CPT | Mod: GP

## 2025-09-03 PROCEDURE — 97110 THERAPEUTIC EXERCISES: CPT | Mod: GP
